# Patient Record
Sex: MALE | Race: WHITE | URBAN - METROPOLITAN AREA
[De-identification: names, ages, dates, MRNs, and addresses within clinical notes are randomized per-mention and may not be internally consistent; named-entity substitution may affect disease eponyms.]

---

## 2017-02-10 DIAGNOSIS — F43.10 PTSD (POST-TRAUMATIC STRESS DISORDER): ICD-10-CM

## 2017-02-10 RX ORDER — DIAZEPAM 5 MG/1
5 TABLET ORAL
Qty: 60 TAB | Refills: 1 | Status: SHIPPED | OUTPATIENT
Start: 2017-02-10 | End: 2017-06-16 | Stop reason: SDUPTHER

## 2017-06-16 ENCOUNTER — OFFICE VISIT (OUTPATIENT)
Dept: FAMILY MEDICINE CLINIC | Age: 47
End: 2017-06-16

## 2017-06-16 VITALS
SYSTOLIC BLOOD PRESSURE: 121 MMHG | RESPIRATION RATE: 14 BRPM | BODY MASS INDEX: 28.25 KG/M2 | TEMPERATURE: 98.5 F | OXYGEN SATURATION: 97 % | HEART RATE: 81 BPM | DIASTOLIC BLOOD PRESSURE: 82 MMHG | WEIGHT: 180 LBS | HEIGHT: 67 IN

## 2017-06-16 DIAGNOSIS — F41.9 ANXIETY: ICD-10-CM

## 2017-06-16 DIAGNOSIS — F43.10 PTSD (POST-TRAUMATIC STRESS DISORDER): Primary | ICD-10-CM

## 2017-06-16 RX ORDER — DIAZEPAM 5 MG/1
5 TABLET ORAL
Qty: 14 TAB | Refills: 0 | Status: SHIPPED | OUTPATIENT
Start: 2017-06-16 | End: 2017-06-26 | Stop reason: SDUPTHER

## 2017-06-16 NOTE — PATIENT INSTRUCTIONS
Diazepam (By mouth)   Diazepam (dye-AZ-e-cora)  Treats anxiety, alcohol withdrawal, muscle spasms, and seizures. Brand Name(s): Valium, diazePAM Intensol   There may be other brand names for this medicine. When This Medicine Should Not Be Used: This medicine is not right for everyone. Do not use it if you had an allergic reaction to diazepam, or if you are pregnant or breastfeeding, or you have narrow-angle glaucoma, myasthenia gravis, sleep apnea, or severe breathing problems. Do not give this medicine to any child 10months of age or younger. How to Use This Medicine:   Liquid, Tablet  · Your doctor will tell you how much medicine to use. Do not use more than directed. · Oral liquid: Measure the oral liquid medicine with a marked measuring spoon, oral syringe, or medicine cup. · This medicine should come with a Medication Guide. Ask your pharmacist for a copy if you do not have one. · Missed dose: Take a dose as soon as you remember. If it is almost time for your next dose, wait until then and take a regular dose. Do not take extra medicine to make up for a missed dose. · Store the medicine in a closed container at room temperature, away from heat, moisture, and direct light. Do not freeze the oral liquid. Drugs and Foods to Avoid:   Ask your doctor or pharmacist before using any other medicine, including over-the-counter medicines, vitamins, and herbal products. · Some medicines can affect how diazepam works. Tell your doctor if you are using any of the following:   ¨ Cimetidine, fluoxetine, fluvoxamine, ketoconazole, omeprazole, phenytoin, or theophylline  ¨ MAO inhibitor  ¨ Medicine to treat depression or mental health problem  ¨ Medicine to treat seizures  ¨ Phenothiazine medicine  · Do not drink alcohol while you are using this medicine. · Tell your doctor if you use anything else that makes you sleepy. Some examples are allergy medicine, narcotic pain medicine, and alcohol.   Warnings While Using This Medicine:   · It is not safe to take this medicine during pregnancy. It could harm an unborn baby. Tell your doctor right away if you become pregnant. · Tell your doctor if you have kidney disease, liver disease, glaucoma, lung or breathing problems, or a history of drug or alcohol abuse, depression, mental health problems, or seizures. · This medicine may cause the following problems:  ¨ Respiratory depression (serious breathing problem that can be life-threatening), when used with narcotic pain medicines  · This medicine can increase thoughts of suicide. Tell your doctor right away if you start to feel depressed and have thoughts about hurting yourself. · This medicine may make you dizzy or drowsy. Do not drive or do anything else that could be dangerous until you know how this medicine affects you. · This medicine can be habit-forming. Do not use more than your prescribed dose. Call your doctor if you think your medicine is not working. · Do not stop using this medicine suddenly. Your doctor will need to slowly decrease your dose before you stop it completely. · Your doctor will do lab tests at regular visits to check on the effects of this medicine. Keep all appointments. · Keep all medicine out of the reach of children. Never share your medicine with anyone.   Possible Side Effects While Using This Medicine:   Call your doctor right away if you notice any of these side effects:  · Allergic reaction: Itching or hives, swelling in your face or hands, swelling or tingling in your mouth or throat, chest tightness, trouble breathing  · Blue lips, fingernails, or skin  · Confusion, lightheadedness, dizziness, problems with muscle control or coordination  · Extreme drowsiness or weakness, slow heartbeat, trouble breathing  · Seizures or tremors  · Unusual mood or behavior, worsening depression, thoughts about hurting yourself, trouble sleeping  If you notice these less serious side effects, talk with your doctor:   · Blurred vision, changes in vision  · Headache  · Nausea, vomiting, constipation, diarrhea, stomach pain  · Tiredness  If you notice other side effects that you think are caused by this medicine, tell your doctor. Call your doctor for medical advice about side effects. You may report side effects to FDA at 0-868-QHE-5495  © 2017 [facility] Information is for End User's use only and may not be sold, redistributed or otherwise used for commercial purposes. The above information is an  only. It is not intended as medical advice for individual conditions or treatments. Talk to your doctor, nurse or pharmacist before following any medical regimen to see if it is safe and effective for you.

## 2017-06-16 NOTE — PROGRESS NOTES
HISTORY OF PRESENT ILLNESS  Elaine Rosales is a 50 y.o. male with pmhx of PtSD and anxiety here today for a refill of his valium. He was being seen by PCP Wade Veloz and she was prescribing his medications but he is in transition with a new PCP due to his provider leaving practice. He has an appointment scheduled with new PCP 9/26/17 TRACY Panchal. He is an employee of [employer] and has a scheduled training trip this week for training and he states he does not have enough medication for his trip and to make it through the week. Patient denies alcohol abuse or illicit drug use. He is currently followed by a psychotherapists as well. After reviewing records the patient has a documented history of being on valium since 2014, and he is a credible historian. Medication Refill   The history is provided by the patient. This is a chronic problem. Review of Systems   Respiratory: Negative. Cardiovascular: Negative. Neurological: Negative. Physical Exam   Constitutional: He appears well-developed and well-nourished. No distress. Cardiovascular: Normal rate and regular rhythm. Pulmonary/Chest: Effort normal and breath sounds normal.   Psychiatric: He has a normal mood and affect. His speech is normal and behavior is normal. Judgment and thought content normal. Cognition and memory are normal.   Vitals reviewed. ASSESSMENT and PLAN  Heather Garcia was seen today for medication refill. Diagnoses and all orders for this visit:    PTSD (post-traumatic stress disorder)  -     diazePAM (VALIUM) 5 mg tablet; Take 1 Tab by mouth every twelve (12) hours as needed for Anxiety. Max Daily Amount: 10 mg. Indications: anxiety, PANIC DISORDER    Anxiety  -     diazePAM (VALIUM) 5 mg tablet; Take 1 Tab by mouth every twelve (12) hours as needed for Anxiety. Max Daily Amount: 10 mg.  Indications: anxiety, PANIC DISORDER     and patient Medical Records reviewed   Reviewed SE and discussed no driving or operating heavy machinery when taking medication   AVS given   Follow up with PCP in 1 week.

## 2017-06-16 NOTE — MR AVS SNAPSHOT
Visit Information Date & Time Provider Department Dept. Phone Encounter #  
 6/16/2017 10:00 AM Gricel Gardner. [facility] [address] [id number] Follow-up Instructions Return in about 1 week (around 6/23/2017) for with PCP . Your Appointments 6/26/2017  8:00 AM  
Follow Up with ALBINO [facility] [place] ([address]) Appt Note: Elise patient-Refill of viagra and valium [place]. [address]  
  
   
 [address] Upcoming Health Maintenance Date Due Pneumococcal 19-64 Medium Risk (1 of 1 - PPSV23) 5/9/1987 INFLUENZA AGE 9 TO ADULT 8/1/2017 DTaP/Tdap/Td series (2 - Td) 4/1/2024 Allergies as of 6/16/2017  Review Complete On: 6/16/2017 By: Murray Contreras NP Severity Noted Reaction Type Reactions Prazosin High 12/04/2014   Side Effect Drowsiness Crestor [Rosuvastatin]  10/27/2014    Myalgia Lipitor [Atorvastatin]  11/17/2014   Not Verified Other (comments)  
 cramps Current Immunizations  Reviewed on 11/8/2016 Name Date Influenza Vaccine 10/27/2014 Influenza Vaccine (Quad) PF 11/8/2016 Influenza Vaccine Split 12/17/2012 Tdap 4/1/2014 Not reviewed this visit You Were Diagnosed With   
  
 Codes Comments PTSD (post-traumatic stress disorder)    -  Primary ICD-10-CM: F43.10 ICD-9-CM: 309.81 Anxiety     ICD-10-CM: F41.9 ICD-9-CM: 300.00 Vitals BP Pulse Temp Resp Height(growth percentile) Weight(growth percentile) 121/82 (BP 1 Location: Left arm, BP Patient Position: Sitting) 81 98.5 °F (36.9 °C) (Oral) 14 5' 7\" (1.702 m) 180 lb (81.6 kg) SpO2 BMI Smoking Status 97% 28.19 kg/m2 Current Every Day Smoker Vitals History BMI and BSA Data Body Mass Index Body Surface Area  
 28.19 kg/m 2 1.96 m 2 Preferred Pharmacy Pharmacy Name Phone [address] [facility], [place] [phone] Your Updated Medication List  
  
   
This list is accurate as of: 6/16/17 10:05 AM.  Always use your most recent med list.  
  
  
  
  
 diazePAM 5 mg tablet Commonly known as:  VALIUM Take 1 Tab by mouth every twelve (12) hours as needed for Anxiety. Max Daily Amount: 10 mg. Indications: anxiety, PANIC DISORDER  
  
 omeprazole 20 mg capsule Commonly known as:  PRILOSEC Take 1 Cap by mouth daily. sildenafil citrate 50 mg tablet Commonly known as:  VIAGRA Take 1 tab by mouth as needed 1 hour prior to sexual activity Prescriptions Printed Refills  
 diazePAM (VALIUM) 5 mg tablet 0 Sig: Take 1 Tab by mouth every twelve (12) hours as needed for Anxiety. Max Daily Amount: 10 mg. Indications: anxiety, PANIC DISORDER Class: Print Route: Oral  
  
Follow-up Instructions Return in about 1 week (around 6/23/2017) for with PCP . Patient Instructions Diazepam (By mouth) Diazepam (dye-AZ-e-cora) Treats anxiety, alcohol withdrawal, muscle spasms, and seizures. Brand Name(s): Valium, diazePAM Intensol There may be other brand names for this medicine. When This Medicine Should Not Be Used: This medicine is not right for everyone. Do not use it if you had an allergic reaction to diazepam, or if you are pregnant or breastfeeding, or you have narrow-angle glaucoma, myasthenia gravis, sleep apnea, or severe breathing problems. Do not give this medicine to any child 10months of age or younger. How to Use This Medicine:  
Liquid, Tablet · Your doctor will tell you how much medicine to use. Do not use more than directed. · Oral liquid: Measure the oral liquid medicine with a marked measuring spoon, oral syringe, or medicine cup. · This medicine should come with a Medication Guide. Ask your pharmacist for a copy if you do not have one. · Missed dose: Take a dose as soon as you remember. If it is almost time for your next dose, wait until then and take a regular dose. Do not take extra medicine to make up for a missed dose. · Store the medicine in a closed container at room temperature, away from heat, moisture, and direct light. Do not freeze the oral liquid. Drugs and Foods to Avoid: Ask your doctor or pharmacist before using any other medicine, including over-the-counter medicines, vitamins, and herbal products. · Some medicines can affect how diazepam works. Tell your doctor if you are using any of the following: ¨ Cimetidine, fluoxetine, fluvoxamine, ketoconazole, omeprazole, phenytoin, or theophylline ¨ MAO inhibitor ¨ Medicine to treat depression or mental health problem ¨ Medicine to treat seizures ¨ Phenothiazine medicine · Do not drink alcohol while you are using this medicine. · Tell your doctor if you use anything else that makes you sleepy. Some examples are allergy medicine, narcotic pain medicine, and alcohol. Warnings While Using This Medicine: · It is not safe to take this medicine during pregnancy. It could harm an unborn baby. Tell your doctor right away if you become pregnant. · Tell your doctor if you have kidney disease, liver disease, glaucoma, lung or breathing problems, or a history of drug or alcohol abuse, depression, mental health problems, or seizures. · This medicine may cause the following problems: 
¨ Respiratory depression (serious breathing problem that can be life-threatening), when used with narcotic pain medicines · This medicine can increase thoughts of suicide. Tell your doctor right away if you start to feel depressed and have thoughts about hurting yourself. · This medicine may make you dizzy or drowsy. Do not drive or do anything else that could be dangerous until you know how this medicine affects you. · This medicine can be habit-forming.  Do not use more than your prescribed dose. Call your doctor if you think your medicine is not working. · Do not stop using this medicine suddenly. Your doctor will need to slowly decrease your dose before you stop it completely. · Your doctor will do lab tests at regular visits to check on the effects of this medicine. Keep all appointments. · Keep all medicine out of the reach of children. Never share your medicine with anyone. Possible Side Effects While Using This Medicine:  
Call your doctor right away if you notice any of these side effects: · Allergic reaction: Itching or hives, swelling in your face or hands, swelling or tingling in your mouth or throat, chest tightness, trouble breathing · Blue lips, fingernails, or skin · Confusion, lightheadedness, dizziness, problems with muscle control or coordination · Extreme drowsiness or weakness, slow heartbeat, trouble breathing · Seizures or tremors · Unusual mood or behavior, worsening depression, thoughts about hurting yourself, trouble sleeping If you notice these less serious side effects, talk with your doctor: · Blurred vision, changes in vision · Headache · Nausea, vomiting, constipation, diarrhea, stomach pain · Tiredness If you notice other side effects that you think are caused by this medicine, tell your doctor. Call your doctor for medical advice about side effects. You may report side effects to FDA at 3-038-FDA-3134 © 2017 [address] Information is for End User's use only and may not be sold, redistributed or otherwise used for commercial purposes. The above information is an  only. It is not intended as medical advice for individual conditions or treatments. Talk to your doctor, nurse or pharmacist before following any medical regimen to see if it is safe and effective for you. Introducing [facility]!    
 Elio Flaherty introduces [employer] patient portal. Now you can access parts of your medical record, email your doctor's office, and request medication refills online. 1. In your internet browser, go to [email or web address] 2. Click on the First Time User? Click Here link in the Sign In box. You will see the New Member Sign Up page. 3. Enter your [employer] Access Code exactly as it appears below. You will not need to use this code after youve completed the sign-up process. If you do not sign up before the expiration date, you must request a new code. · [employer] Access Code: [email or web address] Expires: 9/14/2017 10:03 AM 
 
4. Enter the last four digits of your Social Security Number (xxxx) and Date of Birth (mm/dd/yyyy) as indicated and click Submit. You will be taken to the next sign-up page. 5. Create a [employer]icket[employer] ID. This will be your [employer] login ID and cannot be changed, so think of one that is secure and easy to remember. 6. Create a [employer] password. You can change your password at any time. 7. Enter your Password Reset Question and Answer. This can be used at a later time if you forget your password. 8. Enter your e-mail address. You will receive e-mail notification when new information is available in [address] 9. Click Sign Up. You can now view and download portions of your medical record. 10. Click the Download Summary menu link to download a portable copy of your medical information. If you have questions, please visit the Frequently Asked Questions section of the [employer] website. Remember, [employer] is NOT to be used for urgent needs. For medical emergencies, dial 911. Now available from your iPhone and Android! Please provide this summary of care documentation to your next provider. Your primary care clinician is listed as Darion Hannah. If you have any questions after today's visit, please call [phone].

## 2017-06-26 ENCOUNTER — OFFICE VISIT (OUTPATIENT)
Dept: FAMILY MEDICINE CLINIC | Age: 47
End: 2017-06-26

## 2017-06-26 VITALS
HEART RATE: 77 BPM | TEMPERATURE: 98 F | BODY MASS INDEX: 28.72 KG/M2 | HEIGHT: 67 IN | RESPIRATION RATE: 18 BRPM | SYSTOLIC BLOOD PRESSURE: 121 MMHG | WEIGHT: 183 LBS | DIASTOLIC BLOOD PRESSURE: 76 MMHG

## 2017-06-26 DIAGNOSIS — F52.21 ERECTILE DYSFUNCTION OF NON-ORGANIC ORIGIN: ICD-10-CM

## 2017-06-26 DIAGNOSIS — E55.9 VITAMIN D DEFICIENCY: ICD-10-CM

## 2017-06-26 DIAGNOSIS — M21.42 PES PLANUS OF BOTH FEET: ICD-10-CM

## 2017-06-26 DIAGNOSIS — M72.2 PLANTAR FASCIITIS: ICD-10-CM

## 2017-06-26 DIAGNOSIS — F41.9 ANXIETY: ICD-10-CM

## 2017-06-26 DIAGNOSIS — R73.03 PREDIABETES: ICD-10-CM

## 2017-06-26 DIAGNOSIS — M21.41 PES PLANUS OF BOTH FEET: ICD-10-CM

## 2017-06-26 DIAGNOSIS — E78.2 MIXED HYPERLIPIDEMIA: ICD-10-CM

## 2017-06-26 DIAGNOSIS — F43.10 PTSD (POST-TRAUMATIC STRESS DISORDER): Primary | ICD-10-CM

## 2017-06-26 RX ORDER — DIAZEPAM 5 MG/1
5 TABLET ORAL
Qty: 14 TAB | Refills: 0 | Status: SHIPPED | OUTPATIENT
Start: 2017-06-26 | End: 2018-03-02

## 2017-06-26 RX ORDER — SILDENAFIL 50 MG/1
TABLET, FILM COATED ORAL
Qty: 8 TAB | Refills: 2 | Status: SHIPPED | OUTPATIENT
Start: 2017-06-26

## 2017-06-26 NOTE — PROGRESS NOTES
1. Have you been to the ER, urgent care clinic since your last visit? Hospitalized since your last visit? Yes Where: [facility] [place] 6-16-17 enough medication to bridge to appointment today    2. Have you seen or consulted any other health care providers outside of the [address] [place] [address] since your last visit? Include any pap smears or colon screening.  No

## 2017-06-26 NOTE — PATIENT INSTRUCTIONS
Hyperlipidemia: After Your Visit  Your Care Instructions  Hyperlipidemia is too much fat in your blood. The body has several kinds of fat, including cholesterol and triglycerides. Your body needs fat for many things, such as making new cells. But too much fat in your blood increases your chances of having a heart attack or stroke. You may be able to lower your cholesterol and triglycerides with a heart-healthy diet, exercise, and if needed, medicine. Your doctor may want you to try lifestyle changes first to see whether they lower the fat in your blood. You may need to take medicine if lifestyle changes do not lower the fat in your blood enough. Follow-up care is a key part of your treatment and safety. Be sure to make and go to all appointments, and call your doctor if you are having problems. Its also a good idea to know your test results and keep a list of the medicines you take. How can you care for yourself at home? Take your medicines  · Take your medicines exactly as prescribed. Call your doctor if you think you are having a problem with your medicine. · If you take medicine to lower your cholesterol, go to follow-up visits. You will need to have blood tests. · Do not take large doses of niacin, which is a B vitamin, while taking medicine called statins. It may increase the chance of muscle pain and liver problems. · Talk to your doctor about avoiding grapefruit juice if you are taking statins. Grapefruit juice can raise the level of this medicine in your blood. This could increase side effects. Eat more fruits, vegetables, and fiber  · Fruits and vegetables have lots of nutrients that help protect against heart disease, and they have little--if any--fat. Try to eat at least five servings a day. Dark green, deep orange, or yellow fruits and vegetables are healthy choices. · Keep carrots, celery, and other veggies handy for snacks.  Buy fruit that is in season and store it where you can see it so that you will be tempted to eat it. Cook dishes that have a lot of veggies in them, such as stir-fries and soups. · Foods high in fiber may reduce your cholesterol and provide important vitamins and minerals. High-fiber foods include whole-grain cereals and breads, oatmeal, beans, brown rice, citrus fruits, and apples. · Buy whole-grain breads and cereals instead of white bread and pastries. Limit saturated fat  · Read food labels and try to avoid saturated fat and trans fat. They increase your risk of heart disease. · Use olive or canola oil when you cook. Try cholesterol-lowering spreads, such as Benecol or Take Control. · Bake, broil, grill, or steam foods instead of frying them. · Limit the amount of high-fat meats you eat, including hot dogs and sausages. Cut out all visible fat when you prepare meat. · Eat fish, skinless poultry, and soy products such as tofu instead of high-fat meats. Soybeans may be especially good for your heart. Eat at least two servings of fish a week. Certain fish, such as salmon, contain omega-3 fatty acids, which may help reduce your risk of heart attack. · Choose low-fat or fat-free milk and dairy products. Get exercise, limit alcohol, and quit smoking  · Get more exercise. Work with your doctor to set up an exercise program. Even if you can do only a small amount, exercise will help you get stronger, have more energy, and manage your weight and your stress. Walking is an easy way to get exercise. Gradually increase the amount you walk every day. Aim for at least 30 minutes on most days of the week. You also may want to swim, bike, or do other activities. · Limit alcohol to no more than 2 drinks a day for men and 1 drink a day for women. · Do not smoke. If you need help quitting, talk to your doctor about stop-smoking programs and medicines. These can increase your chances of quitting for good. When should you call for help?   Call 911 anytime you think you may need emergency care. For example, call if:  · You have symptoms of a heart attack. These may include:  ¨ Chest pain or pressure, or a strange feeling in the chest.  ¨ Sweating. ¨ Shortness of breath. ¨ Nausea or vomiting. ¨ Pain, pressure, or a strange feeling in the back, neck, jaw, or upper belly or in one or both shoulders or arms. ¨ Lightheadedness or sudden weakness. ¨ A fast or irregular heartbeat. After you call 911, the  may tell you to chew 1 adult-strength or 2 to 4 low-dose aspirin. Wait for an ambulance. Do not try to drive yourself. · You have signs of a stroke. These may include:  ¨ Sudden numbness, paralysis, or weakness in your face, arm, or leg, especially on only one side of your body. ¨ New problems with walking or balance. ¨ Sudden vision changes. ¨ Drooling or slurred speech. ¨ New problems speaking or understanding simple statements, or feeling confused. ¨ A sudden, severe headache that is different from past headaches. · You passed out (lost consciousness). Call your doctor now or seek immediate medical care if:  · You have muscle pain or weakness. Watch closely for changes in your health, and be sure to contact your doctor if:  · You are very tired. · You have an upset stomach, gas, constipation, or belly pain or cramps. Where can you learn more? Go to [employer]  Enter C406 in the search box to learn more about \"Hyperlipidemia: After Your Visit. \"   © 4232-6404 [employer], [employer]. Care instructions adapted under license by [employer] (which disclaims liability or warranty for this information). This care instruction is for use with your licensed healthcare professional. If you have questions about a medical condition or this instruction, always ask your healthcare professional. [place] [address] any warranty or liability for your use of this information.   Content Version: 7.3.237221; Last Revised: October 13, 2011              Hyperlipidemia: After Your Visit  Your Care Instructions  Hyperlipidemia is too much fat in your blood. The body has several kinds of fat, including cholesterol and triglycerides. Your body needs fat for many things, such as making new cells. But too much fat in your blood increases your chances of having a heart attack or stroke. You may be able to lower your cholesterol and triglycerides with a heart-healthy diet, exercise, and if needed, medicine. Your doctor may want you to try lifestyle changes first to see whether they lower the fat in your blood. You may need to take medicine if lifestyle changes do not lower the fat in your blood enough. Follow-up care is a key part of your treatment and safety. Be sure to make and go to all appointments, and call your doctor if you are having problems. Its also a good idea to know your test results and keep a list of the medicines you take. How can you care for yourself at home? Take your medicines  · Take your medicines exactly as prescribed. Call your doctor if you think you are having a problem with your medicine. · If you take medicine to lower your cholesterol, go to follow-up visits. You will need to have blood tests. · Do not take large doses of niacin, which is a B vitamin, while taking medicine called statins. It may increase the chance of muscle pain and liver problems. · Talk to your doctor about avoiding grapefruit juice if you are taking statins. Grapefruit juice can raise the level of this medicine in your blood. This could increase side effects. Eat more fruits, vegetables, and fiber  · Fruits and vegetables have lots of nutrients that help protect against heart disease, and they have little--if any--fat. Try to eat at least five servings a day. Dark green, deep orange, or yellow fruits and vegetables are healthy choices. · Keep carrots, celery, and other veggies handy for snacks.  Buy fruit that is in season and store it where you can see it so that you will be tempted to eat it. Cook dishes that have a lot of veggies in them, such as stir-fries and soups. · Foods high in fiber may reduce your cholesterol and provide important vitamins and minerals. High-fiber foods include whole-grain cereals and breads, oatmeal, beans, brown rice, citrus fruits, and apples. · Buy whole-grain breads and cereals instead of white bread and pastries. Limit saturated fat  · Read food labels and try to avoid saturated fat and trans fat. They increase your risk of heart disease. · Use olive or canola oil when you cook. Try cholesterol-lowering spreads, such as Benecol or Take Control. · Bake, broil, grill, or steam foods instead of frying them. · Limit the amount of high-fat meats you eat, including hot dogs and sausages. Cut out all visible fat when you prepare meat. · Eat fish, skinless poultry, and soy products such as tofu instead of high-fat meats. Soybeans may be especially good for your heart. Eat at least two servings of fish a week. Certain fish, such as salmon, contain omega-3 fatty acids, which may help reduce your risk of heart attack. · Choose low-fat or fat-free milk and dairy products. Get exercise, limit alcohol, and quit smoking  · Get more exercise. Work with your doctor to set up an exercise program. Even if you can do only a small amount, exercise will help you get stronger, have more energy, and manage your weight and your stress. Walking is an easy way to get exercise. Gradually increase the amount you walk every day. Aim for at least 30 minutes on most days of the week. You also may want to swim, bike, or do other activities. · Limit alcohol to no more than 2 drinks a day for men and 1 drink a day for women. · Do not smoke. If you need help quitting, talk to your doctor about stop-smoking programs and medicines. These can increase your chances of quitting for good. When should you call for help?   Call 911 anytime you think you may need emergency care. For example, call if:  · You have symptoms of a heart attack. These may include:  ¨ Chest pain or pressure, or a strange feeling in the chest.  ¨ Sweating. ¨ Shortness of breath. ¨ Nausea or vomiting. ¨ Pain, pressure, or a strange feeling in the back, neck, jaw, or upper belly or in one or both shoulders or arms. ¨ Lightheadedness or sudden weakness. ¨ A fast or irregular heartbeat. After you call 911, the  may tell you to chew 1 adult-strength or 2 to 4 low-dose aspirin. Wait for an ambulance. Do not try to drive yourself. · You have signs of a stroke. These may include:  ¨ Sudden numbness, paralysis, or weakness in your face, arm, or leg, especially on only one side of your body. ¨ New problems with walking or balance. ¨ Sudden vision changes. ¨ Drooling or slurred speech. ¨ New problems speaking or understanding simple statements, or feeling confused. ¨ A sudden, severe headache that is different from past headaches. · You passed out (lost consciousness). Call your doctor now or seek immediate medical care if:  · You have muscle pain or weakness. Watch closely for changes in your health, and be sure to contact your doctor if:  · You are very tired. · You have an upset stomach, gas, constipation, or belly pain or cramps. Where can you learn more? Go to [employer]  Enter C406 in the search box to learn more about \"Hyperlipidemia: After Your Visit. \"   © 4296-5446 Healthwise, Incorporated. Care instructions adapted under license by [employer] (which disclaims liability or warranty for this information). This care instruction is for use with your licensed healthcare professional. If you have questions about a medical condition or this instruction, always ask your healthcare professional. [place] [address] any warranty or liability for your use of this information.   Content Version: 6.5.636826; Last Revised: October 13, 2011                 DASH Diet: Care Instructions  Your Care Instructions  The DASH diet is an eating plan that can help lower your blood pressure. DASH stands for Dietary Approaches to Stop Hypertension. Hypertension is high blood pressure. The DASH diet focuses on eating foods that are high in calcium, potassium, and magnesium. These nutrients can lower blood pressure. The foods that are highest in these nutrients are fruits, vegetables, low-fat dairy products, nuts, seeds, and legumes. But taking calcium, potassium, and magnesium supplements instead of eating foods that are high in those nutrients does not have the same effect. The DASH diet also includes whole grains, fish, and poultry. The DASH diet is one of several lifestyle changes your doctor may recommend to lower your high blood pressure. Your doctor may also want you to decrease the amount of sodium in your diet. Lowering sodium while following the DASH diet can lower blood pressure even further than just the DASH diet alone. Follow-up care is a key part of your treatment and safety. Be sure to make and go to all appointments, and call your doctor if you are having problems. It's also a good idea to know your test results and keep a list of the medicines you take. How can you care for yourself at home? Following the DASH diet  · Eat 4 to 5 servings of fruit each day. A serving is 1 medium-sized piece of fruit, ½ cup chopped or canned fruit, 1/4 cup dried fruit, or 4 ounces (½ cup) of fruit juice. Choose fruit more often than fruit juice. · Eat 4 to 5 servings of vegetables each day. A serving is 1 cup of lettuce or raw leafy vegetables, ½ cup of chopped or cooked vegetables, or 4 ounces (½ cup) of vegetable juice. Choose vegetables more often than vegetable juice. · Get 2 to 3 servings of low-fat and fat-free dairy each day. A serving is 8 ounces of milk, 1 cup of yogurt, or 1 ½ ounces of cheese. · Eat 6 to 8 servings of grains each day.  A serving is 1 slice of bread, 1 ounce of dry cereal, or ½ cup of cooked rice, pasta, or cooked cereal. Try to choose whole-grain products as much as possible. · Limit lean meat, poultry, and fish to 2 servings each day. A serving is 3 ounces, about the size of a deck of cards. · Eat 4 to 5 servings of nuts, seeds, and legumes (cooked dried beans, lentils, and split peas) each week. A serving is 1/3 cup of nuts, 2 tablespoons of seeds, or ½ cup of cooked beans or peas. · Limit fats and oils to 2 to 3 servings each day. A serving is 1 teaspoon of vegetable oil or 2 tablespoons of salad dressing. · Limit sweets and added sugars to 5 servings or less a week. A serving is 1 tablespoon jelly or jam, ½ cup sorbet, or 1 cup of lemonade. · Eat less than 2,300 milligrams (mg) of sodium a day. If you limit your sodium to 1,500 mg a day, you can lower your blood pressure even more. Tips for success  · Start small. Do not try to make dramatic changes to your diet all at once. You might feel that you are missing out on your favorite foods and then be more likely to not follow the plan. Make small changes, and stick with them. Once those changes become habit, add a few more changes. · Try some of the following:  ¨ Make it a goal to eat a fruit or vegetable at every meal and at snacks. This will make it easy to get the recommended amount of fruits and vegetables each day. ¨ Try yogurt topped with fruit and nuts for a snack or healthy dessert. ¨ Add lettuce, tomato, cucumber, and onion to sandwiches. ¨ Combine a ready-made pizza crust with low-fat mozzarella cheese and lots of vegetable toppings. Try using tomatoes, squash, spinach, broccoli, carrots, cauliflower, and onions. ¨ Have a variety of cut-up vegetables with a low-fat dip as an appetizer instead of chips and dip. ¨ Sprinkle sunflower seeds or chopped almonds over salads. Or try adding chopped walnuts or almonds to cooked vegetables.   ¨ Try some vegetarian meals using beans and peas. Add garbanzo or kidney beans to salads. Make burritos and tacos with mashed matos beans or black beans. Where can you learn more? Go to http://alejandro-ge.info/. Enter J327 in the search box to learn more about \"DASH Diet: Care Instructions. \"  Current as of: April 3, 2017  Content Version: 11.3  © 2501-9363 [employer]. Care instructions adapted under license by [employer] (which disclaims liability or warranty for this information). If you have questions about a medical condition or this instruction, always ask your healthcare professional. [place] [address] any warranty or liability for your use of this information. DASH Diet: Care Instructions  Your Care Instructions  The DASH diet is an eating plan that can help lower your blood pressure. DASH stands for Dietary Approaches to Stop Hypertension. Hypertension is high blood pressure. The DASH diet focuses on eating foods that are high in calcium, potassium, and magnesium. These nutrients can lower blood pressure. The foods that are highest in these nutrients are fruits, vegetables, low-fat dairy products, nuts, seeds, and legumes. But taking calcium, potassium, and magnesium supplements instead of eating foods that are high in those nutrients does not have the same effect. The DASH diet also includes whole grains, fish, and poultry. The DASH diet is one of several lifestyle changes your doctor may recommend to lower your high blood pressure. Your doctor may also want you to decrease the amount of sodium in your diet. Lowering sodium while following the DASH diet can lower blood pressure even further than just the DASH diet alone. Follow-up care is a key part of your treatment and safety. Be sure to make and go to all appointments, and call your doctor if you are having problems.  It's also a good idea to know your test results and keep a list of the medicines you take.  How can you care for yourself at home? Following the DASH diet  · Eat 4 to 5 servings of fruit each day. A serving is 1 medium-sized piece of fruit, ½ cup chopped or canned fruit, 1/4 cup dried fruit, or 4 ounces (½ cup) of fruit juice. Choose fruit more often than fruit juice. · Eat 4 to 5 servings of vegetables each day. A serving is 1 cup of lettuce or raw leafy vegetables, ½ cup of chopped or cooked vegetables, or 4 ounces (½ cup) of vegetable juice. Choose vegetables more often than vegetable juice. · Get 2 to 3 servings of low-fat and fat-free dairy each day. A serving is 8 ounces of milk, 1 cup of yogurt, or 1 ½ ounces of cheese. · Eat 6 to 8 servings of grains each day. A serving is 1 slice of bread, 1 ounce of dry cereal, or ½ cup of cooked rice, pasta, or cooked cereal. Try to choose whole-grain products as much as possible. · Limit lean meat, poultry, and fish to 2 servings each day. A serving is 3 ounces, about the size of a deck of cards. · Eat 4 to 5 servings of nuts, seeds, and legumes (cooked dried beans, lentils, and split peas) each week. A serving is 1/3 cup of nuts, 2 tablespoons of seeds, or ½ cup of cooked beans or peas. · Limit fats and oils to 2 to 3 servings each day. A serving is 1 teaspoon of vegetable oil or 2 tablespoons of salad dressing. · Limit sweets and added sugars to 5 servings or less a week. A serving is 1 tablespoon jelly or jam, ½ cup sorbet, or 1 cup of lemonade. · Eat less than 2,300 milligrams (mg) of sodium a day. If you limit your sodium to 1,500 mg a day, you can lower your blood pressure even more. Tips for success  · Start small. Do not try to make dramatic changes to your diet all at once. You might feel that you are missing out on your favorite foods and then be more likely to not follow the plan. Make small changes, and stick with them. Once those changes become habit, add a few more changes.   · Try some of the following:  ¨ Make it a goal to eat a fruit or vegetable at every meal and at snacks. This will make it easy to get the recommended amount of fruits and vegetables each day. ¨ Try yogurt topped with fruit and nuts for a snack or healthy dessert. ¨ Add lettuce, tomato, cucumber, and onion to sandwiches. ¨ Combine a ready-made pizza crust with low-fat mozzarella cheese and lots of vegetable toppings. Try using tomatoes, squash, spinach, broccoli, carrots, cauliflower, and onions. ¨ Have a variety of cut-up vegetables with a low-fat dip as an appetizer instead of chips and dip. ¨ Sprinkle sunflower seeds or chopped almonds over salads. Or try adding chopped walnuts or almonds to cooked vegetables. ¨ Try some vegetarian meals using beans and peas. Add garbanzo or kidney beans to salads. Make burritos and tacos with mashed matos beans or black beans. Where can you learn more? Go to http://alejandro-ge.info/. Enter G399 in the search box to learn more about \"DASH Diet: Care Instructions. \"  Current as of: April 3, 2017  Content Version: 11.3  © 2868-7648 [employer]. Care instructions adapted under license by [email or web address] (which disclaims liability or warranty for this information). If you have questions about a medical condition or this instruction, always ask your healthcare professional. [facility] any warranty or liability for your use of this information.

## 2017-06-26 NOTE — PROGRESS NOTES
Chief Complaint   Patient presents with    Establish Care    Medication Refill     HPI:    This is a 49 y/o formal male patient of  ALBINO Ochoa who presents for the above reason    Conform history of PTSD, depression and anxiety and need Valium refill. Per patient he  gabapentin for a long time and was started on Valium for this which have been helping. Sates he sees psych but Diagnosed with PTSD and was taken off gabentin and started on Valium Gabriela    See psychiatrist but states he saw different ones when asked request continue prescription for valium from his psychiatrist.  Today he request for a new referral and list of psychiatrist in the area given to him and he was advised to call and schedule appointment with one. Patient also need Viagra refill. Have been on this for a long time. He states is able to initiate erection but have problem maintaining. Last labs show elevated :  A1C at 5.7  Vit D low at 28.3    Lipids also elevated - patient states he was on Zetia but stopped taking because he has been managing with diet and exercise. Component Results   Component Value Flag Ref Range Units Status   LIPID PROFILE         Final   Cholesterol, total 219 (H) <200 MG/DL Final   Triglyceride 253 (H) <150 MG/DL Final   HDL Cholesterol 38 (L) 40 - 60 MG/DL Final   LDL, calculated 130.4 (H) 0 - 100 MG/DL Final   VLDL, calculated 50.6   MG/DL Final   CHOL/HDL Ratio 5.8 (H) 0 - 5.0   Final       Patient need to shoe inserts for plantar fascitis and flat feet- he was advised to follow up with his podiatrist    ROS:   Pt denies: Wt loss, Fever/Chills, HA, Visual changes, Fatigue, Chest pain, SOB, FELIPE, Abd pain, N/V/D/C, Blood in stool or urine, Edema. Pertinent positive as above in HPI.  All others were negative      Past Medical History:   Diagnosis Date    Anemia NEC     Arthritis     Calculus of kidney     Chronic pain     COPD     Hernia 11/02/2014    2    Hypercholesterolemia     Psychotic disorder  PTSD (post-traumatic stress disorder)        Past Surgical History:   Procedure Laterality Date    HX ORTHOPAEDIC  2007, 1986    ankle surgery ( borostrom surgery), l knee surgery       Family History   Problem Relation Age of Onset    Diabetes Mother     Heart Disease Mother     Hypertension Mother     Diabetes Father     Heart Disease Father     Stroke Father     Hypertension Father     Bleeding Prob Father        Social History     Social History    Marital status: UNKNOWN     Spouse name: N/A    Number of children: N/A    Years of education: N/A     Occupational History    Not on file. Social History Main Topics    Smoking status: Current Every Day Smoker     Packs/day: 0.50     Years: 29.00     Types: Cigarettes    Smokeless tobacco: Never Used    Alcohol use Not on file    Drug use: No    Sexual activity: Not on file     Other Topics Concern    Not on file     Social History Narrative     Current Outpatient Prescriptions   Medication Sig Dispense Refill    diazePAM (VALIUM) 5 mg tablet Take 1 Tab by mouth every twelve (12) hours as needed for Anxiety. Max Daily Amount: 10 mg. Indications: anxiety, PANIC DISORDER 14 Tab 0    omeprazole (PRILOSEC) 20 mg capsule Take 1 Cap by mouth daily.  30 Cap 2    sildenafil citrate (VIAGRA) 50 mg tablet Take 1 tab by mouth as needed 1 hour prior to sexual activity 8 Tab 2     Allergies   Allergen Reactions    Prazosin Drowsiness    Crestor [Rosuvastatin] Myalgia    Lipitor [Atorvastatin] Other (comments)     cramps       Physical Exam:    Vital Signs:   Visit Vitals    /76    Pulse 77    Temp 98 °F (36.7 °C) (Oral)    Resp 18    Ht 5' 7\" (1.702 m)    Wt 183 lb (83 kg)    BMI 28.66 kg/m2         General: a, a & o x 3, afebrile, interacting appropriately, in no acute distress  HEENT: head normocephalic and atraumatic, conjuctiva clear  Skin: warm and dry, no rashes , no bruises, no skin lesions  Neck: supple, symmetrical, no palpable mass, no thyromegaly, no carotid bruits, no JVD  Respiratory: lung sounds clear bilaterally, good air entry, good respiratory effort, no wheezes or crackles  Cardiovascular: normal S1S2, regular rate and rhythm, no murmurs  Abdomen: non-distended, normoactive bowel sounds x 4 quadrants, soft, non-tender to palpation  Musculoskeletal: normal ROM on all joints, no swelling or deformity  Psychiatric: a, a & o x 3, appropriate mood and affect, no thought disorder    Assessment/Plan:      ICD-10-CM ICD-9-CM    1. PTSD (post-traumatic stress disorder) F43.10 309.81 diazePAM (VALIUM) 5 mg tablet      CBC WITH AUTOMATED DIFF      REFERRAL TO PSYCHIATRY   2. Anxiety F41.9 300.00 diazePAM (VALIUM) 5 mg tablet   3. Erectile dysfunction of non-organic origin F52.21 302.72 sildenafil citrate (VIAGRA) 50 mg tablet      TESTOSTERONE, FREE & TOTAL      HEMOGLOBIN A1C WITH EAG    Last A1C -5.7   4. Prediabetes R75.76 366.19 METABOLIC PANEL, COMPREHENSIVE   5. Mixed hyperlipidemia E78.2 272.2 TSH 3RD GENERATION      LIPID PANEL      T4, FREE   6. Vitamin D deficiency E55.9 268.9 VITAMIN D, 25 HYDROXY   7. Plantar fasciitis M72.2 728.71 Follow up with podiatry   8. Pes planus of both feet M21.41 734     M21.42           Additional Notes: Discussed today's diagnosis, treatment plans. Discussed medication indications and side effects. After Visit Summary: Provided and discussed printed patient instructions. Answered questions in relation to today's diagnosis.   Follow-up Disposition: 1 [facility] chronic condition and  Lab review  And return for fasting lab tomorrow        Nahid Husain NP-BC  Family Medicine  [facility]        Orders Placed This Encounter    CBC WITH AUTOMATED DIFF    TSH 3RD GENERATION    LIPID PANEL    VITAMIN D, 25 HYDROXY    TESTOSTERONE, FREE & TOTAL    METABOLIC PANEL, COMPREHENSIVE    T4, FREE    HEMOGLOBIN A1C WITH EAG    REFERRAL TO PSYCHIATRY    diazePAM (VALIUM) 5 mg tablet    sildenafil citrate (VIAGRA) 50 mg tablet

## 2017-06-26 NOTE — MR AVS SNAPSHOT
Visit Information Date & Time Provider Department Dept. Phone Encounter #  
 6/26/2017  8:00 AM Cesar Lee NP [facility] [id number] Follow-up Instructions Return in about 1 month (around 7/26/2017) for Return tomorrow for fasting lab and follow up hyperliupidemai and lab review. Upcoming Health Maintenance Date Due Pneumococcal 19-64 Medium Risk (1 of 1 - PPSV23) 5/9/1987 INFLUENZA AGE 9 TO ADULT 8/1/2017 DTaP/Tdap/Td series (2 - Td) 4/1/2024 Allergies as of 6/26/2017  Review Complete On: 6/26/2017 By: Юлия Wolfe LPN Severity Noted Reaction Type Reactions Prazosin High 12/04/2014   Side Effect Drowsiness Crestor [Rosuvastatin]  10/27/2014    Myalgia Lipitor [Atorvastatin]  11/17/2014   Not Verified Other (comments)  
 cramps Current Immunizations  Reviewed on 11/8/2016 Name Date Influenza Vaccine 10/27/2014 Influenza Vaccine (Quad) PF 11/8/2016 Influenza Vaccine Split 12/17/2012 Tdap 4/1/2014 Not reviewed this visit You Were Diagnosed With   
  
 Codes Comments Prediabetes    -  Primary ICD-10-CM: R73.03 
ICD-9-CM: 790.29 PTSD (post-traumatic stress disorder)     ICD-10-CM: F43.10 ICD-9-CM: 309.81 Anxiety     ICD-10-CM: F41.9 ICD-9-CM: 300.00 Erectile dysfunction of non-organic origin     ICD-10-CM: F52.21 ICD-9-CM: 302.72 Mixed hyperlipidemia     ICD-10-CM: E78.2 ICD-9-CM: 272.2 Vitamin D deficiency     ICD-10-CM: E55.9 ICD-9-CM: 268.9 Plantar fasciitis     ICD-10-CM: M72.2 ICD-9-CM: 728.71 Pes planus of both feet     ICD-10-CM: M21.41, M21.42 
ICD-9-CM: 548 Vitals BP Pulse Temp Resp Height(growth percentile) Weight(growth percentile) 121/76 77 98 °F (36.7 °C) (Oral) 18 5' 7\" (1.702 m) 183 lb (83 kg) BMI Smoking Status 28.66 kg/m2 Current Every Day Smoker Vitals History BMI and BSA Data Body Mass Index Body Surface Area  
 28.66 kg/m 2 1.98 m 2 Preferred Pharmacy [facility] Name [facility] [address] [facility], [place] [phone] Your Updated Medication List  
  
   
This list is accurate as of: 6/26/17  9:02 AM.  Always use your most recent med list.  
  
  
  
  
 diazePAM 5 mg tablet Commonly known as:  VALIUM Take 1 Tab by mouth every twelve (12) hours as needed for Anxiety. Max Daily Amount: 10 mg. Indications: anxiety, PANIC DISORDER  
  
 omeprazole 20 mg capsule Commonly known as:  PRILOSEC Take 1 Cap by mouth daily. sildenafil citrate 50 mg tablet Commonly known as:  VIAGRA Take 1 tab by mouth as needed 1 hour prior to sexual activity Prescriptions Printed Refills  
 diazePAM (VALIUM) 5 mg tablet 0 Sig: Take 1 Tab by mouth every twelve (12) hours as needed for Anxiety. Max Daily Amount: 10 mg. Indications: anxiety, PANIC DISORDER Class: Print Route: Oral  
  
Prescriptions Sent to Pharmacy Refills  
 sildenafil citrate (VIAGRA) 50 mg tablet 2 Sig: Take 1 tab by mouth as needed 1 hour prior to sexual activity Class: Normal  
 [facility] [id number] [facility] [address] [facility], [place] Ph #: [phone] We Performed the Following REFERRAL TO PSYCHIATRY [REF91 Custom] Comments:  
 Please evaluate patient for PTSD, anxiety and depression Follow-up Instructions Return in about 1 month (around 7/26/2017) for Return tomorrow for fasting lab and follow up hyperliupidemai and lab review. To-Do List   
 06/26/2017 Lab:  CBC WITH AUTOMATED DIFF   
  
 06/26/2017 Lab:  HEMOGLOBIN A1C WITH EAG   
  
 06/26/2017 Lab:  LIPID PANEL   
  
 06/26/2017 Lab:  METABOLIC PANEL, COMPREHENSIVE   
  
 06/26/2017 Lab:  T4, FREE   
  
 06/26/2017 Lab:  TESTOSTERONE, FREE & TOTAL   
  
 06/26/2017 Lab:  TSH 3RD GENERATION   
  
 06/26/2017 Lab:  VITAMIN D, 25 HYDROXY Referral Information Referral ID Referred By Referred To  
  
 [id number] Mi Alfaro Not Available Visits Status Start Date End Date 1 New Request 6/26/17 6/26/18 If your referral has a status of pending review or denied, additional information will be sent to support the outcome of this decision. Patient Instructions Hyperlipidemia: After Your Visit Your Care Instructions Hyperlipidemia is too much fat in your blood. The body has several kinds of fat, including cholesterol and triglycerides. Your body needs fat for many things, such as making new cells. But too much fat in your blood increases your chances of having a heart attack or stroke. You may be able to lower your cholesterol and triglycerides with a heart-healthy diet, exercise, and if needed, medicine. Your doctor may want you to try lifestyle changes first to see whether they lower the fat in your blood. You may need to take medicine if lifestyle changes do not lower the fat in your blood enough. Follow-up care is a key part of your treatment and safety. Be sure to make and go to all appointments, and call your doctor if you are having problems. Its also a good idea to know your test results and keep a list of the medicines you take. How can you care for yourself at home? Take your medicines · Take your medicines exactly as prescribed. Call your doctor if you think you are having a problem with your medicine. · If you take medicine to lower your cholesterol, go to follow-up visits. You will need to have blood tests. · Do not take large doses of niacin, which is a B vitamin, while taking medicine called statins. It may increase the chance of muscle pain and liver problems. · Talk to your doctor about avoiding grapefruit juice if you are taking statins.  Grapefruit juice can raise the level of this medicine in your blood. This could increase side effects. Eat more fruits, vegetables, and fiber · Fruits and vegetables have lots of nutrients that help protect against heart disease, and they have littleif anyfat. Try to eat at least five servings a day. Dark green, deep orange, or yellow fruits and vegetables are healthy choices. · Keep carrots, celery, and other veggies handy for snacks. Buy fruit that is in season and store it where you can see it so that you will be tempted to eat it. Cook dishes that have a lot of veggies in them, such as stir-fries and soups. · Foods high in fiber may reduce your cholesterol and provide important vitamins and minerals. High-fiber foods include whole-grain cereals and breads, oatmeal, beans, brown rice, citrus fruits, and apples. · Buy whole-grain breads and cereals instead of white bread and pastries. Limit saturated fat · Read food labels and try to avoid saturated fat and trans fat. They increase your risk of heart disease. · Use olive or canola oil when you cook. Try cholesterol-lowering spreads, such as Benecol or Take Control. · Bake, broil, grill, or steam foods instead of frying them. · Limit the amount of high-fat meats you eat, including hot dogs and sausages. Cut out all visible fat when you prepare meat. · Eat fish, skinless poultry, and soy products such as tofu instead of high-fat meats. Soybeans may be especially good for your heart. Eat at least two servings of fish a week. Certain fish, such as salmon, contain omega-3 fatty acids, which may help reduce your risk of heart attack. · Choose low-fat or fat-free milk and dairy products. Get exercise, limit alcohol, and quit smoking · Get more exercise. Work with your doctor to set up an exercise program. Even if you can do only a small amount, exercise will help you get stronger, have more energy, and manage your weight and your stress. Walking is an easy way to get exercise.  Gradually increase the amount you walk every day. Aim for at least 30 minutes on most days of the week. You also may want to swim, bike, or do other activities. · Limit alcohol to no more than 2 drinks a day for men and 1 drink a day for women. · Do not smoke. If you need help quitting, talk to your doctor about stop-smoking programs and medicines. These can increase your chances of quitting for good. When should you call for help? Call 911 anytime you think you may need emergency care. For example, call if: 
· You have symptoms of a heart attack. These may include: ¨ Chest pain or pressure, or a strange feeling in the chest. 
¨ Sweating. ¨ Shortness of breath. ¨ Nausea or vomiting. ¨ Pain, pressure, or a strange feeling in the back, neck, jaw, or upper belly or in one or both shoulders or arms. ¨ Lightheadedness or sudden weakness. ¨ A fast or irregular heartbeat. After you call 911, the  may tell you to chew 1 adult-strength or 2 to 4 low-dose aspirin. Wait for an ambulance. Do not try to drive yourself. · You have signs of a stroke. These may include: 
¨ Sudden numbness, paralysis, or weakness in your face, arm, or leg, especially on only one side of your body. ¨ New problems with walking or balance. ¨ Sudden vision changes. ¨ Drooling or slurred speech. ¨ New problems speaking or understanding simple statements, or feeling confused. ¨ A sudden, severe headache that is different from past headaches. · You passed out (lost consciousness). Call your doctor now or seek immediate medical care if: 
· You have muscle pain or weakness. Watch closely for changes in your health, and be sure to contact your doctor if: 
· You are very tired. · You have an upset stomach, gas, constipation, or belly pain or cramps. Where can you learn more? Go to [employer] Enter C406 in the search box to learn more about \"Hyperlipidemia: After Your Visit. \"  
 © 7774-9901 Healthwise, [employer]. Care instructions adapted under license by Trinh Wilcox (which disclaims liability or warranty for this information). This care instruction is for use with your licensed healthcare professional. If you have questions about a medical condition or this instruction, always ask your healthcare professional. [address] any warranty or liability for your use of this information. Content Version: 9.3.041959; Last Revised: October 13, 2011 Hyperlipidemia: After Your Visit Your Care Instructions Hyperlipidemia is too much fat in your blood. The body has several kinds of fat, including cholesterol and triglycerides. Your body needs fat for many things, such as making new cells. But too much fat in your blood increases your chances of having a heart attack or stroke. You may be able to lower your cholesterol and triglycerides with a heart-healthy diet, exercise, and if needed, medicine. Your doctor may want you to try lifestyle changes first to see whether they lower the fat in your blood. You may need to take medicine if lifestyle changes do not lower the fat in your blood enough. Follow-up care is a key part of your treatment and safety. Be sure to make and go to all appointments, and call your doctor if you are having problems. Its also a good idea to know your test results and keep a list of the medicines you take. How can you care for yourself at home? Take your medicines · Take your medicines exactly as prescribed. Call your doctor if you think you are having a problem with your medicine. · If you take medicine to lower your cholesterol, go to follow-up visits. You will need to have blood tests. · Do not take large doses of niacin, which is a B vitamin, while taking medicine called statins. It may increase the chance of muscle pain and liver problems.  
· Talk to your doctor about avoiding grapefruit juice if you are taking statins. Grapefruit juice can raise the level of this medicine in your blood. This could increase side effects. Eat more fruits, vegetables, and fiber · Fruits and vegetables have lots of nutrients that help protect against heart disease, and they have littleif anyfat. Try to eat at least five servings a day. Dark green, deep orange, or yellow fruits and vegetables are healthy choices. · Keep carrots, celery, and other veggies handy for snacks. Buy fruit that is in season and store it where you can see it so that you will be tempted to eat it. Cook dishes that have a lot of veggies in them, such as stir-fries and soups. · Foods high in fiber may reduce your cholesterol and provide important vitamins and minerals. High-fiber foods include whole-grain cereals and breads, oatmeal, beans, brown rice, citrus fruits, and apples. · Buy whole-grain breads and cereals instead of white bread and pastries. Limit saturated fat · Read food labels and try to avoid saturated fat and trans fat. They increase your risk of heart disease. · Use olive or canola oil when you cook. Try cholesterol-lowering spreads, such as Benecol or Take Control. · Bake, broil, grill, or steam foods instead of frying them. · Limit the amount of high-fat meats you eat, including hot dogs and sausages. Cut out all visible fat when you prepare meat. · Eat fish, skinless poultry, and soy products such as tofu instead of high-fat meats. Soybeans may be especially good for your heart. Eat at least two servings of fish a week. Certain fish, such as salmon, contain omega-3 fatty acids, which may help reduce your risk of heart attack. · Choose low-fat or fat-free milk and dairy products. Get exercise, limit alcohol, and quit smoking · Get more exercise. Work with your doctor to set up an exercise program. Even if you can do only a small amount, exercise will help you get stronger, have more energy, and manage your weight and your stress. Walking is an easy way to get exercise. Gradually increase the amount you walk every day. Aim for at least 30 minutes on most days of the week. You also may want to swim, bike, or do other activities. · Limit alcohol to no more than 2 drinks a day for men and 1 drink a day for women. · Do not smoke. If you need help quitting, talk to your doctor about stop-smoking programs and medicines. These can increase your chances of quitting for good. When should you call for help? Call 911 anytime you think you may need emergency care. For example, call if: 
· You have symptoms of a heart attack. These may include: ¨ Chest pain or pressure, or a strange feeling in the chest. 
¨ Sweating. ¨ Shortness of breath. ¨ Nausea or vomiting. ¨ Pain, pressure, or a strange feeling in the back, neck, jaw, or upper belly or in one or both shoulders or arms. ¨ Lightheadedness or sudden weakness. ¨ A fast or irregular heartbeat. After you call 911, the  may tell you to chew 1 adult-strength or 2 to 4 low-dose aspirin. Wait for an ambulance. Do not try to drive yourself. · You have signs of a stroke. These may include: 
¨ Sudden numbness, paralysis, or weakness in your face, arm, or leg, especially on only one side of your body. ¨ New problems with walking or balance. ¨ Sudden vision changes. ¨ Drooling or slurred speech. ¨ New problems speaking or understanding simple statements, or feeling confused. ¨ A sudden, severe headache that is different from past headaches. · You passed out (lost consciousness). Call your doctor now or seek immediate medical care if: 
· You have muscle pain or weakness. Watch closely for changes in your health, and be sure to contact your doctor if: 
· You are very tired. · You have an upset stomach, gas, constipation, or belly pain or cramps. Where can you learn more? Go to [employer] Enter [phone] in the search box to learn more about \"Hyperlipidemia: After Your Visit. \"  
© 20062529-2265 Healthwise, [employer]. Care instructions adapted under license by Shivam Finch (which disclaims liability or warranty for this information). This care instruction is for use with your licensed healthcare professional. If you have questions about a medical condition or this instruction, always ask your healthcare professional. [address] any warranty or liability for your use of this information. Content Version: 5.9.953685; Last Revised: October 13, 2011 DASH Diet: Care Instructions Your Care Instructions The DASH diet is an eating plan that can help lower your blood pressure. DASH stands for Dietary Approaches to Stop Hypertension. Hypertension is high blood pressure. The DASH diet focuses on eating foods that are high in calcium, potassium, and magnesium. These nutrients can lower blood pressure. The foods that are highest in these nutrients are fruits, vegetables, low-fat dairy products, nuts, seeds, and legumes. But taking calcium, potassium, and magnesium supplements instead of eating foods that are high in those nutrients does not have the same effect. The DASH diet also includes whole grains, fish, and poultry. The DASH diet is one of several lifestyle changes your doctor may recommend to lower your high blood pressure. Your doctor may also want you to decrease the amount of sodium in your diet. Lowering sodium while following the DASH diet can lower blood pressure even further than just the DASH diet alone. Follow-up care is a key part of your treatment and safety. Be sure to make and go to all appointments, and call your doctor if you are having problems. It's also a good idea to know your test results and keep a list of the medicines you take. How can you care for yourself at home? Following the DASH diet · Eat 4 to 5 servings of fruit each day.  A serving is 1 medium-sized piece of fruit, ½ cup chopped or canned fruit, 1/4 cup dried fruit, or 4 ounces (½ cup) of fruit juice. Choose fruit more often than fruit juice. · Eat 4 to 5 servings of vegetables each day. A serving is 1 cup of lettuce or raw leafy vegetables, ½ cup of chopped or cooked vegetables, or 4 ounces (½ cup) of vegetable juice. Choose vegetables more often than vegetable juice. · Get 2 to 3 servings of low-fat and fat-free dairy each day. A serving is 8 ounces of milk, 1 cup of yogurt, or 1 ½ ounces of cheese. · Eat 6 to 8 servings of grains each day. A serving is 1 slice of bread, 1 ounce of dry cereal, or ½ cup of cooked rice, pasta, or cooked cereal. Try to choose whole-grain products as much as possible. · Limit lean meat, poultry, and fish to 2 servings each day. A serving is 3 ounces, about the size of a deck of cards. · Eat 4 to 5 servings of nuts, seeds, and legumes (cooked dried beans, lentils, and split peas) each week. A serving is 1/3 cup of nuts, 2 tablespoons of seeds, or ½ cup of cooked beans or peas. · Limit fats and oils to 2 to 3 servings each day. A serving is 1 teaspoon of vegetable oil or 2 tablespoons of salad dressing. · Limit sweets and added sugars to 5 servings or less a week. A serving is 1 tablespoon jelly or jam, ½ cup sorbet, or 1 cup of lemonade. · Eat less than 2,300 milligrams (mg) of sodium a day. If you limit your sodium to 1,500 mg a day, you can lower your blood pressure even more. Tips for success · Start small. Do not try to make dramatic changes to your diet all at once. You might feel that you are missing out on your favorite foods and then be more likely to not follow the plan. Make small changes, and stick with them. Once those changes become habit, add a few more changes. · Try some of the following: ¨ Make it a goal to eat a fruit or vegetable at every meal and at snacks. This will make it easy to get the recommended amount of fruits and vegetables each day. ¨ Try yogurt topped with fruit and nuts for a snack or healthy dessert. ¨ Add lettuce, tomato, cucumber, and onion to sandwiches. ¨ Combine a ready-made pizza crust with low-fat mozzarella cheese and lots of vegetable toppings. Try using tomatoes, squash, spinach, broccoli, carrots, cauliflower, and onions. ¨ Have a variety of cut-up vegetables with a low-fat dip as an appetizer instead of chips and dip. ¨ Sprinkle sunflower seeds or chopped almonds over salads. Or try adding chopped walnuts or almonds to cooked vegetables. ¨ Try some vegetarian meals using beans and peas. Add garbanzo or kidney beans to salads. Make burritos and tacos with mashed matos beans or black beans. Where can you learn more? Go to http://[employer].info/. Enter U749 in the search box to learn more about \"DASH Diet: Care Instructions. \" Current as of: April 3, 2017 Content Version: 11.3 © 0161-4030 [employer]. Care instructions adapted under license by [employer] (which disclaims liability or warranty for this information). If you have questions about a medical condition or this instruction, always ask your healthcare professional. [address] any warranty or liability for your use of this information. DASH Diet: Care Instructions Your Care Instructions The DASH diet is an eating plan that can help lower your blood pressure. DASH stands for Dietary Approaches to Stop Hypertension. Hypertension is high blood pressure. The DASH diet focuses on eating foods that are high in calcium, potassium, and magnesium. These nutrients can lower blood pressure. The foods that are highest in these nutrients are fruits, vegetables, low-fat dairy products, nuts, seeds, and legumes. But taking calcium, potassium, and magnesium supplements instead of eating foods that are high in those nutrients does not have the same effect.  The DASH diet also includes whole grains, fish, and poultry. The DASH diet is one of several lifestyle changes your doctor may recommend to lower your high blood pressure. Your doctor may also want you to decrease the amount of sodium in your diet. Lowering sodium while following the DASH diet can lower blood pressure even further than just the DASH diet alone. Follow-up care is a key part of your treatment and safety. Be sure to make and go to all appointments, and call your doctor if you are having problems. It's also a good idea to know your test results and keep a list of the medicines you take. How can you care for yourself at home? Following the DASH diet · Eat 4 to 5 servings of fruit each day. A serving is 1 medium-sized piece of fruit, ½ cup chopped or canned fruit, 1/4 cup dried fruit, or 4 ounces (½ cup) of fruit juice. Choose fruit more often than fruit juice. · Eat 4 to 5 servings of vegetables each day. A serving is 1 cup of lettuce or raw leafy vegetables, ½ cup of chopped or cooked vegetables, or 4 ounces (½ cup) of vegetable juice. Choose vegetables more often than vegetable juice. · Get 2 to 3 servings of low-fat and fat-free dairy each day. A serving is 8 ounces of milk, 1 cup of yogurt, or 1 ½ ounces of cheese. · Eat 6 to 8 servings of grains each day. A serving is 1 slice of bread, 1 ounce of dry cereal, or ½ cup of cooked rice, pasta, or cooked cereal. Try to choose whole-grain products as much as possible. · Limit lean meat, poultry, and fish to 2 servings each day. A serving is 3 ounces, about the size of a deck of cards. · Eat 4 to 5 servings of nuts, seeds, and legumes (cooked dried beans, lentils, and split peas) each week. A serving is 1/3 cup of nuts, 2 tablespoons of seeds, or ½ cup of cooked beans or peas. · Limit fats and oils to 2 to 3 servings each day. A serving is 1 teaspoon of vegetable oil or 2 tablespoons of salad dressing. · Limit sweets and added sugars to 5 servings or less a week.  A serving is 1 tablespoon jelly or jam, ½ cup sorbet, or 1 cup of lemonade. · Eat less than 2,300 milligrams (mg) of sodium a day. If you limit your sodium to 1,500 mg a day, you can lower your blood pressure even more. Tips for success · Start small. Do not try to make dramatic changes to your diet all at once. You might feel that you are missing out on your favorite foods and then be more likely to not follow the plan. Make small changes, and stick with them. Once those changes become habit, add a few more changes. · Try some of the following: ¨ Make it a goal to eat a fruit or vegetable at every meal and at snacks. This will make it easy to get the recommended amount of fruits and vegetables each day. ¨ Try yogurt topped with fruit and nuts for a snack or healthy dessert. ¨ Add lettuce, tomato, cucumber, and onion to sandwiches. ¨ Combine a ready-made pizza crust with low-fat mozzarella cheese and lots of vegetable toppings. Try using tomatoes, squash, spinach, broccoli, carrots, cauliflower, and onions. ¨ Have a variety of cut-up vegetables with a low-fat dip as an appetizer instead of chips and dip. ¨ Sprinkle sunflower seeds or chopped almonds over salads. Or try adding chopped walnuts or almonds to cooked vegetables. ¨ Try some vegetarian meals using beans and peas. Add garbanzo or kidney beans to salads. Make burritos and tacos with mashed matos beans or black beans. Where can you learn more? Go to [email or web address] Enter W625 in the search box to learn more about \"DASH Diet: Care Instructions. \" Current as of: April 3, 2017 Content Version: 11.3 © 6774-6579 [employer]. Care instructions adapted under license by [employer] (which disclaims liability or warranty for this information).  If you have questions about a medical condition or this instruction, always ask your healthcare professional. [employer] disclaims any warranty or liability for your use of this information. Introducing [facility] & HE[facility]! Beronica Amin introduces EPIS patient portal. Now you can access parts of your medical record, email your doctor's office, and request medication refills online. 1. In your internet browser, go to [email or web address] 2. Click on the First Time User? Click Here link in the Sign In box. You will see the New Member Sign Up page. 3. Enter your EPIS Access Code exactly as it appears below. You will not need to use this code after youve completed the sign-up process. If you do not sign up before the expiration date, you must request a new code. · EPIS Access Code: [email or web address] Expires: 9/14/2017 10:03 AM 
 
4. Enter the last four digits of your Social Security Number (xxxx) and Date of Birth (mm/dd/yyyy) as indicated and click Submit. You will be taken to the next sign-up page. 5. Create a EPIS ID. This will be your EPIS login ID and cannot be changed, so think of one that is secure and easy to remember. 6. Create a EPIS password. You can change your password at any time. 7. Enter your Password Reset Question and Answer. This can be used at a later time if you forget your password. 8. Enter your e-mail address. You will receive e-mail notification when new information is available in [address] 9. Click Sign Up. You can now view and download portions of your medical record. 10. Click the Download Summary menu link to download a portable copy of your medical information. If you have questions, please visit the Frequently Asked Questions section of the EPIS website. Remember, EPIS is NOT to be used for urgent needs. For medical emergencies, dial 911. Now available from your iPhone and Android! Please provide this summary of care documentation to your next provider. Your primary care clinician is listed as Genie Viramontes. If you have any questions after today's visit, please call [phone].

## 2017-06-27 ENCOUNTER — HOSPITAL ENCOUNTER (OUTPATIENT)
Dept: LAB | Age: 47
Discharge: HOME | End: 2017-06-27
Payer: OTHER GOVERNMENT

## 2017-06-27 DIAGNOSIS — F43.10 PTSD (POST-TRAUMATIC STRESS DISORDER): ICD-10-CM

## 2017-06-27 DIAGNOSIS — E78.2 MIXED HYPERLIPIDEMIA: ICD-10-CM

## 2017-06-27 DIAGNOSIS — E55.9 VITAMIN D DEFICIENCY: ICD-10-CM

## 2017-06-27 DIAGNOSIS — R73.03 PREDIABETES: ICD-10-CM

## 2017-06-27 DIAGNOSIS — F52.21 ERECTILE DYSFUNCTION OF NON-ORGANIC ORIGIN: ICD-10-CM

## 2017-06-27 LAB
25(OH)D3 SERPL-MCNC: 25.2 NG/ML (ref 30–100)
ALBUMIN SERPL BCP-MCNC: 3.4 G/DL (ref 3.4–5)
ALBUMIN/GLOB SERPL: 1.2 {RATIO} (ref 0.8–1.7)
ALP SERPL-CCNC: 70 U/L (ref 45–117)
ALT SERPL-CCNC: 54 U/L (ref 16–61)
ANION GAP BLD CALC-SCNC: 6 MMOL/L (ref 3–18)
AST SERPL W P-5'-P-CCNC: 26 U/L (ref 15–37)
BASOPHILS # BLD AUTO: 0.1 K/UL (ref 0–0.06)
BASOPHILS # BLD: 1 % (ref 0–2)
BILIRUB SERPL-MCNC: 0.6 MG/DL (ref 0.2–1)
BUN SERPL-MCNC: 12 MG/DL (ref 7–18)
BUN/CREAT SERPL: 14 (ref 12–20)
CALCIUM SERPL-MCNC: 8.2 MG/DL (ref 8.5–10.1)
CHLORIDE SERPL-SCNC: 107 MMOL/L (ref 100–108)
CHOLEST SERPL-MCNC: 269 MG/DL
CO2 SERPL-SCNC: 27 MMOL/L (ref 21–32)
CREAT SERPL-MCNC: 0.88 MG/DL (ref 0.6–1.3)
DIFFERENTIAL METHOD BLD: ABNORMAL
EOSINOPHIL # BLD: 0.3 K/UL (ref 0–0.4)
EOSINOPHIL NFR BLD: 4 % (ref 0–5)
ERYTHROCYTE [DISTWIDTH] IN BLOOD BY AUTOMATED COUNT: 13.2 % (ref 11.6–14.5)
EST. AVERAGE GLUCOSE BLD GHB EST-MCNC: 114 MG/DL
GLOBULIN SER CALC-MCNC: 2.8 G/DL (ref 2–4)
GLUCOSE SERPL-MCNC: 99 MG/DL (ref 74–99)
HBA1C MFR BLD: 5.6 % (ref 4.2–5.6)
HCT VFR BLD AUTO: 42.8 % (ref 36–48)
HDLC SERPL-MCNC: 37 MG/DL (ref 40–60)
HDLC SERPL: 7.3 {RATIO} (ref 0–5)
HGB BLD-MCNC: 14.6 G/DL (ref 13–16)
LDLC SERPL CALC-MCNC: 166.4 MG/DL (ref 0–100)
LIPID PROFILE,FLP: ABNORMAL
LYMPHOCYTES # BLD AUTO: 32 % (ref 21–52)
LYMPHOCYTES # BLD: 2.6 K/UL (ref 0.9–3.6)
MCH RBC QN AUTO: 31.7 PG (ref 24–34)
MCHC RBC AUTO-ENTMCNC: 34.1 G/DL (ref 31–37)
MCV RBC AUTO: 93 FL (ref 74–97)
MONOCYTES # BLD: 0.5 K/UL (ref 0.05–1.2)
MONOCYTES NFR BLD AUTO: 6 % (ref 3–10)
NEUTS SEG # BLD: 4.6 K/UL (ref 1.8–8)
NEUTS SEG NFR BLD AUTO: 57 % (ref 40–73)
PLATELET # BLD AUTO: 217 K/UL (ref 135–420)
PMV BLD AUTO: 11 FL (ref 9.2–11.8)
POTASSIUM SERPL-SCNC: 4.2 MMOL/L (ref 3.5–5.5)
PROT SERPL-MCNC: 6.2 G/DL (ref 6.4–8.2)
RBC # BLD AUTO: 4.6 M/UL (ref 4.7–5.5)
SODIUM SERPL-SCNC: 140 MMOL/L (ref 136–145)
T4 FREE SERPL-MCNC: 1.2 NG/DL (ref 0.7–1.5)
TRIGL SERPL-MCNC: 328 MG/DL (ref ?–150)
TSH SERPL DL<=0.05 MIU/L-ACNC: 1.13 UIU/ML (ref 0.36–3.74)
VLDLC SERPL CALC-MCNC: 65.6 MG/DL
WBC # BLD AUTO: 8.1 K/UL (ref 4.6–13.2)

## 2017-06-27 PROCEDURE — 84403 ASSAY OF TOTAL TESTOSTERONE: CPT | Performed by: NURSE PRACTITIONER

## 2017-06-27 PROCEDURE — 84439 ASSAY OF FREE THYROXINE: CPT | Performed by: NURSE PRACTITIONER

## 2017-06-27 PROCEDURE — 80053 COMPREHEN METABOLIC PANEL: CPT | Performed by: NURSE PRACTITIONER

## 2017-06-27 PROCEDURE — 80061 LIPID PANEL: CPT | Performed by: NURSE PRACTITIONER

## 2017-06-27 PROCEDURE — 83036 HEMOGLOBIN GLYCOSYLATED A1C: CPT | Performed by: NURSE PRACTITIONER

## 2017-06-27 PROCEDURE — 84443 ASSAY THYROID STIM HORMONE: CPT | Performed by: NURSE PRACTITIONER

## 2017-06-27 PROCEDURE — 85025 COMPLETE CBC W/AUTO DIFF WBC: CPT | Performed by: NURSE PRACTITIONER

## 2017-06-27 PROCEDURE — 82306 VITAMIN D 25 HYDROXY: CPT | Performed by: NURSE PRACTITIONER

## 2017-06-29 LAB
COMMENT, TESC2: NORMAL
TESTOST FREE SERPL-MCNC: 10 PG/ML (ref 6.8–21.5)
TESTOST SERPL-MCNC: 463 NG/DL (ref 348–1197)

## 2017-07-05 ENCOUNTER — TELEPHONE (OUTPATIENT)
Dept: FAMILY MEDICINE CLINIC | Age: 47
End: 2017-07-05

## 2017-07-05 DIAGNOSIS — E78.2 MIXED HYPERLIPIDEMIA: Primary | ICD-10-CM

## 2017-07-05 RX ORDER — EZETIMIBE 10 MG/1
10 TABLET ORAL DAILY
Qty: 30 TAB | Refills: 1 | Status: SHIPPED | OUTPATIENT
Start: 2017-07-05

## 2017-07-05 NOTE — PROGRESS NOTES
Pt lipids elevated and worsening from last lab- he is allergic to statins and I have sent Zetia to his pharmacy. I did call and left a message to call our office back.      Vit D slight low- take 1000 units OTC

## 2017-07-11 DIAGNOSIS — F41.9 ANXIETY: ICD-10-CM

## 2017-07-11 DIAGNOSIS — F43.10 PTSD (POST-TRAUMATIC STRESS DISORDER): ICD-10-CM

## 2017-07-14 RX ORDER — DIAZEPAM 5 MG/1
5 TABLET ORAL
Qty: 14 TAB | Refills: 0 | OUTPATIENT
Start: 2017-07-14

## 2018-03-02 ENCOUNTER — OFFICE VISIT (OUTPATIENT)
Dept: FAMILY MEDICINE CLINIC | Age: 48
End: 2018-03-02

## 2018-03-02 VITALS
HEART RATE: 69 BPM | SYSTOLIC BLOOD PRESSURE: 131 MMHG | TEMPERATURE: 97.1 F | OXYGEN SATURATION: 97 % | DIASTOLIC BLOOD PRESSURE: 83 MMHG | RESPIRATION RATE: 17 BRPM | BODY MASS INDEX: 28.72 KG/M2 | HEIGHT: 67 IN | WEIGHT: 183 LBS

## 2018-03-02 DIAGNOSIS — M54.31 SCIATICA OF RIGHT SIDE: Primary | ICD-10-CM

## 2018-03-02 DIAGNOSIS — M72.2 PLANTAR FASCIITIS: ICD-10-CM

## 2018-03-02 DIAGNOSIS — R03.0 ELEVATED BP WITHOUT DIAGNOSIS OF HYPERTENSION: ICD-10-CM

## 2018-03-02 DIAGNOSIS — E78.5 HYPERLIPIDEMIA, UNSPECIFIED HYPERLIPIDEMIA TYPE: ICD-10-CM

## 2018-03-02 DIAGNOSIS — E55.9 VITAMIN D DEFICIENCY: ICD-10-CM

## 2018-03-02 RX ORDER — CYCLOBENZAPRINE HCL 10 MG
10 TABLET ORAL
Qty: 90 TAB | Refills: 1 | Status: SHIPPED | OUTPATIENT
Start: 2018-03-02

## 2018-03-02 RX ORDER — CLONAZEPAM 1 MG/1
1 TABLET ORAL 2 TIMES DAILY
COMMUNITY
Start: 2018-01-09

## 2018-03-02 RX ORDER — PRAZOSIN HYDROCHLORIDE 2 MG/1
2 CAPSULE ORAL
COMMUNITY

## 2018-03-02 RX ORDER — PREDNISONE 20 MG/1
60 TABLET ORAL
Qty: 15 TAB | Refills: 0 | Status: SHIPPED | OUTPATIENT
Start: 2018-03-02

## 2018-03-02 NOTE — PROGRESS NOTES
[facility]    CC: Chronic Disease Management    HPI:     Plantar Fasciitis:  - Bilateral (R>L)  - Repots it is an issue 2/2 his flat foot and a right ankle Broström surgery in 2007  - Has been an issue for the past 10 years  - Seeing podiatry. - Is using orthotics prescribed by podiatrist, which has effectively treated his plantar fasciitis  - States he would like a prescription sent to the [place] for new orthotics  - [facility]    Sciatica:  - Chronic issue  - Currently having a flare.  Unknown trigger.  - Right side  - Pain is sharp and shooting  - Endorses associated spasms    Vitamin D deficiency:  - Last checked on 6-27-17  - Currently not taking any prescribed supplement    HLD:  - Taking Zetia as prescribed  - Denies any side effects or issues with his medication      ROS: Positive items marked in RED  CON: fever, chills  Cardiovascular: palpitations, CP  Resp: cough, SOB  GI: nausea, vomiting, diarrhea  SKIN: rash, itching  : dysuria, hematuria        Past Medical History:   Diagnosis Date    Anemia NEC     Arthritis     Calculus of kidney     Chronic pain     COPD     Hernia 11/02/2014    2    Hypercholesterolemia     Psychotic disorder     PTSD (post-traumatic stress disorder)        Past Surgical History:   Procedure Laterality Date    HX ORTHOPAEDIC  2007, 1986    ankle surgery ( borostrom surgery), l knee surgery       Family History   Problem Relation Age of Onset    Diabetes Mother     Heart Disease Mother     Hypertension Mother     Diabetes Father     Heart Disease Father     Stroke Father     Hypertension Father     Bleeding Prob Father        Social History     Social History    Marital status: UNKNOWN     Spouse name: N/A    Number of children: N/A    Years of education: N/A     Social History Main Topics    Smoking status: Current Every Day Smoker     Packs/day: 0.50     Years: 29.00     Types: Cigarettes    Smokeless tobacco: Never Used    Alcohol use Not on file    Drug use: No    Sexual activity: Not on file     Other Topics Concern    Not on file     Social History Narrative       Allergies   Allergen Reactions    Prazosin Drowsiness    Crestor [Rosuvastatin] Myalgia    Lipitor [Atorvastatin] Other (comments)     cramps         Current Outpatient Prescriptions:     clonazePAM (KLONOPIN) 1 mg tablet, Take 1 mg by mouth two (2) times a day., Disp: , Rfl:     prazosin (MINIPRESS) 2 mg capsule, Take 2 mg by mouth nightly., Disp: , Rfl:     ezetimibe (ZETIA) 10 mg tablet, Take 1 Tab by mouth daily. , Disp: 30 Tab, Rfl: 1    sildenafil citrate (VIAGRA) 50 mg tablet, Take 1 tab by mouth as needed 1 hour prior to sexual activity, Disp: 8 Tab, Rfl: 2    Physical Exam:      /83  Pulse 69  Temp 97.1 °F (36.2 °C)  Resp 17  Ht 5' 7\" (1.702 m)  Wt 183 lb (83 kg)  SpO2 97%  BMI 28.66 kg/m2    General:  WD, WN, NAD  Eyes: sclera clear bilaterally, no discharge noted, eyelids normal in appearance  HENT: NCAT  Lungs: CTAB, Normal respiratory effort and rate  CV: RRR, no MRGs  ABD: soft, non-tender, non-distended, normal bowel sounds  Skin: normal temperature, turgor, color, and texture  Psych: alert and oriented to person, place and time, normal affect  Neuro: Speech normal, Moving all extremities      Assessment/Plan     Plantar Fasciitis:  - Will defer management to podiatry  - Discussed with patient that we were not integrated with the [place] and I was not sure how DME is done through the [place] system  - Recommended he discuss the process at his upcoming appointment at the [place]      Sciatica:  - Recommended rest and use of OTC medication  - Will start on prednisone and Flexeril regimen  - Follow up in one month      HLD:  - Will continue current Zetia regimen  - Will check fasting lipid panel  - Follow up in one month      Vitamin D Deficiency:  - Will check Vitamin D level  - Follow up in one month      Elevated BP:  - Likely elevated due to current pain  - Will check CMP and CBC  - BP will need to be re-evaluated at follow up visit  - Follow up in one month        Barbi Vergara MD  3/2/2018, 9:01 AM

## 2018-03-10 LAB
25(OH)D3+25(OH)D2 SERPL-MCNC: 13.2 NG/ML (ref 30–100)
ALBUMIN SERPL-MCNC: 4.3 G/DL (ref 3.5–5.5)
ALBUMIN/GLOB SERPL: 2 {RATIO} (ref 1.2–2.2)
ALP SERPL-CCNC: 66 IU/L (ref 39–117)
ALT SERPL-CCNC: 26 IU/L (ref 0–44)
AST SERPL-CCNC: 25 IU/L (ref 0–40)
BASOPHILS # BLD AUTO: 0 X10E3/UL (ref 0–0.2)
BASOPHILS NFR BLD AUTO: 0 %
BILIRUB SERPL-MCNC: 0.8 MG/DL (ref 0–1.2)
BUN SERPL-MCNC: 11 MG/DL (ref 6–24)
BUN/CREAT SERPL: 14 (ref 9–20)
CALCIUM SERPL-MCNC: 9.2 MG/DL (ref 8.7–10.2)
CHLORIDE SERPL-SCNC: 101 MMOL/L (ref 96–106)
CHOLEST SERPL-MCNC: 247 MG/DL (ref 100–199)
CO2 SERPL-SCNC: 25 MMOL/L (ref 18–29)
CREAT SERPL-MCNC: 0.78 MG/DL (ref 0.76–1.27)
EOSINOPHIL # BLD AUTO: 0.3 X10E3/UL (ref 0–0.4)
EOSINOPHIL NFR BLD AUTO: 4 %
ERYTHROCYTE [DISTWIDTH] IN BLOOD BY AUTOMATED COUNT: 13.5 % (ref 12.3–15.4)
GFR SERPLBLD CREATININE-BSD FMLA CKD-EPI: 106 ML/MIN/1.73
GFR SERPLBLD CREATININE-BSD FMLA CKD-EPI: 123 ML/MIN/1.73
GLOBULIN SER CALC-MCNC: 2.2 G/DL (ref 1.5–4.5)
GLUCOSE SERPL-MCNC: 92 MG/DL (ref 65–99)
HCT VFR BLD AUTO: 42.1 % (ref 37.5–51)
HDLC SERPL-MCNC: 31 MG/DL
HGB BLD-MCNC: 14.3 G/DL (ref 13–17.7)
IMM GRANULOCYTES # BLD: 0 X10E3/UL (ref 0–0.1)
IMM GRANULOCYTES NFR BLD: 0 %
INTERPRETATION, 910389: NORMAL
LDLC SERPL CALC-MCNC: 152 MG/DL (ref 0–99)
LYMPHOCYTES # BLD AUTO: 2.7 X10E3/UL (ref 0.7–3.1)
LYMPHOCYTES NFR BLD AUTO: 30 %
MCH RBC QN AUTO: 32.3 PG (ref 26.6–33)
MCHC RBC AUTO-ENTMCNC: 34 G/DL (ref 31.5–35.7)
MCV RBC AUTO: 95 FL (ref 79–97)
MONOCYTES # BLD AUTO: 0.5 X10E3/UL (ref 0.1–0.9)
MONOCYTES NFR BLD AUTO: 5 %
NEUTROPHILS # BLD AUTO: 5.5 X10E3/UL (ref 1.4–7)
NEUTROPHILS NFR BLD AUTO: 61 %
PLATELET # BLD AUTO: 240 X10E3/UL (ref 150–379)
POTASSIUM SERPL-SCNC: 4.5 MMOL/L (ref 3.5–5.2)
PROT SERPL-MCNC: 6.5 G/DL (ref 6–8.5)
RBC # BLD AUTO: 4.43 X10E6/UL (ref 4.14–5.8)
SODIUM SERPL-SCNC: 142 MMOL/L (ref 134–144)
TRIGL SERPL-MCNC: 318 MG/DL (ref 0–149)
VLDLC SERPL CALC-MCNC: 64 MG/DL (ref 5–40)
WBC # BLD AUTO: 9.1 X10E3/UL (ref 3.4–10.8)

## 2018-03-13 ENCOUNTER — OFFICE VISIT (OUTPATIENT)
Dept: FAMILY MEDICINE CLINIC | Age: 48
End: 2018-03-13

## 2018-03-13 VITALS
BODY MASS INDEX: 28.72 KG/M2 | WEIGHT: 183 LBS | TEMPERATURE: 97.4 F | SYSTOLIC BLOOD PRESSURE: 134 MMHG | HEART RATE: 76 BPM | DIASTOLIC BLOOD PRESSURE: 75 MMHG | OXYGEN SATURATION: 98 % | HEIGHT: 67 IN | RESPIRATION RATE: 17 BRPM

## 2018-03-13 DIAGNOSIS — M54.31 SCIATICA OF RIGHT SIDE: ICD-10-CM

## 2018-03-13 DIAGNOSIS — E78.5 HYPERLIPIDEMIA, UNSPECIFIED HYPERLIPIDEMIA TYPE: ICD-10-CM

## 2018-03-13 DIAGNOSIS — E55.9 VITAMIN D DEFICIENCY: ICD-10-CM

## 2018-03-13 DIAGNOSIS — I10 ESSENTIAL HYPERTENSION: ICD-10-CM

## 2018-03-13 DIAGNOSIS — G47.33 OSA (OBSTRUCTIVE SLEEP APNEA): Primary | ICD-10-CM

## 2018-03-13 RX ORDER — MELATONIN
1000 DAILY
Qty: 90 TAB | Refills: 1 | Status: SHIPPED | OUTPATIENT
Start: 2018-03-13

## 2018-03-13 RX ORDER — LISINOPRIL 10 MG/1
10 TABLET ORAL DAILY
Qty: 30 TAB | Refills: 5 | Status: SHIPPED | OUTPATIENT
Start: 2018-03-13

## 2018-03-13 RX ORDER — PRAVASTATIN SODIUM 10 MG/1
10 TABLET ORAL
Qty: 30 TAB | Refills: 5 | Status: SHIPPED | OUTPATIENT
Start: 2018-03-13

## 2018-03-13 NOTE — PROGRESS NOTES
[facility]    CC: F/U for chronic Disease Management    HPI:     HLD:  - Got requested fasting lipid panel  - Hx of myalgias with Crestor and Lipitor  - Currently taking Zetia as prescribed without any side effects or issues  - Diet is currently unrestricted  - Does not exercise      HTN:  - Got requested blood work  - On no medication  - Diet is unrestricted  - Does not check BP at home  - Endorses regular caffiene use (2 cups of coffee in morning and red bull in the middle of the day)  - Does not exercise      RANCHO:  - First diagnosed around 2008  - Using CPAP as prescribed without any issue  - Was being seen by [facility]  - Has appointment with Dr. Ameya Calderon this week  - Needs referral      Nicotine Dependence:  - unchanged since last visit  - Has tried multiple times to quit in the past using various treatment strategies with no success  - Currently not ready to quit      Sciatica:  - Took medication as prescribed  -  Reports that shooting pain has resolved      Vitamin D Deficiency:  - On no vitamin D supplement      ROS: Positive items marked in RED  CON: fever, chills  HEENT: HA  Cardiovascular: palpitations, CP  Resp: cough, SOB  Neuro: numbness, weakness, changes in vision/speech      Past Medical History:   Diagnosis Date    Anemia NEC     Arthritis     Calculus of kidney     Chronic pain     COPD     Hernia 11/02/2014    2    Hypercholesterolemia     Psychotic disorder     PTSD (post-traumatic stress disorder)        Past Surgical History:   Procedure Laterality Date    HX ORTHOPAEDIC  2007, 1986    ankle surgery ( borostrom surgery), l knee surgery       Family History   Problem Relation Age of Onset    Diabetes Mother     Heart Disease Mother     Hypertension Mother     Diabetes Father     Heart Disease Father     Stroke Father     Hypertension Father     Bleeding Prob Father        Social History     Social History    Marital status: UNKNOWN Spouse name: N/A    Number of children: N/A    Years of education: N/A     Social History Main Topics    Smoking status: Current Every Day Smoker     Packs/day: 0.50     Years: 29.00     Types: Cigarettes    Smokeless tobacco: Never Used    Alcohol use None    Drug use: No    Sexual activity: Not Asked     Other Topics Concern    None     Social History Narrative       Allergies   Allergen Reactions    Prazosin Drowsiness    Crestor [Rosuvastatin] Myalgia    Lipitor [Atorvastatin] Other (comments)     cramps         Current Outpatient Prescriptions:     clonazePAM (KLONOPIN) 1 mg tablet, Take 1 mg by mouth two (2) times a day., Disp: , Rfl:     prazosin (MINIPRESS) 2 mg capsule, Take 2 mg by mouth nightly., Disp: , Rfl:     predniSONE (DELTASONE) 20 mg tablet, Take 3 Tabs by mouth daily (with breakfast). , Disp: 15 Tab, Rfl: 0    cyclobenzaprine (FLEXERIL) 10 mg tablet, Take 1 Tab by mouth three (3) times daily as needed for Muscle Spasm(s). Indications: Muscle Spasm, Disp: 90 Tab, Rfl: 1    ezetimibe (ZETIA) 10 mg tablet, Take 1 Tab by mouth daily. , Disp: 30 Tab, Rfl: 1    sildenafil citrate (VIAGRA) 50 mg tablet, Take 1 tab by mouth as needed 1 hour prior to sexual activity, Disp: 8 Tab, Rfl: 2    Physical Exam:      /75 (BP 1 Location: Left arm, BP Patient Position: Sitting)  Pulse 76  Temp 97.4 °F (36.3 °C) (Oral)   Resp 17  Ht 5' 7\" (1.702 m)  Wt 183 lb (83 kg)  SpO2 98%  BMI 28.66 kg/m2    General:  WD, WN, NAD  Eyes: sclera clear bilaterally, no discharge noted, eyelids normal in appearance  HENT: NCAT  Lungs: CTAB, Normal respiratory effort and rate  CV: RRR, no MRGs  ABD: soft, non-tender, non-distended, normal bowel sounds  Ext: no peripheral edema or digital cyanosis noted  Skin: normal temperature, turgor, color, and texture  Psych: alert and oriented to person, place and time, normal affect  Neuro: speech normal, moving all extremities, gait normal    Results for Domenica Love (MRN [id number]):   Ref. Range 3/9/2018 08:31   WBC Latest Ref Range: 3.4 - 10.8 x10E3/uL 9.1   RBC Latest Ref Range: 4.14 - 5.80 x10E6/uL 4.43   HGB Latest Ref Range: 13.0 - 17.7 g/dL 14.3   HCT Latest Ref Range: 37.5 - 51.0 % 42.1   MCV Latest Ref Range: 79 - 97 fL 95   MCH Latest Ref Range: 26.6 - 33.0 pg 32.3   MCHC Latest Ref Range: 31.5 - 35.7 g/dL 34.0   RDW Latest Ref Range: 12.3 - 15.4 % 13.5   PLATELET Latest Ref Range: 150 - 379 x10E3/uL 240   NEUTROPHILS Latest Ref Range: Not Estab. % 61   Lymphocytes Latest Ref Range: Not Estab. % 30   MONOCYTES Latest Ref Range: Not Estab. % 5   EOSINOPHILS Latest Ref Range: Not Estab. % 4   BASOPHILS Latest Ref Range: Not Estab. % 0   IMMATURE GRANULOCYTES Latest Ref Range: Not Estab. % 0   ABS. NEUTROPHILS Latest Ref Range: 1.4 - 7.0 x10E3/uL 5.5   ABS. IMM. GRANS. Latest Ref Range: 0.0 - 0.1 x10E3/uL 0.0   Abs Lymphocytes Latest Ref Range: 0.7 - 3.1 x10E3/uL 2.7   ABS. MONOCYTES Latest Ref Range: 0.1 - 0.9 x10E3/uL 0.5   ABS. EOSINOPHILS Latest Ref Range: 0.0 - 0.4 x10E3/uL 0.3   ABS. BASOPHILS Latest Ref Range: 0.0 - 0.2 x10E3/uL 0.0   Sodium Latest Ref Range: 134 - 144 mmol/L 142   Potassium Latest Ref Range: 3.5 - 5.2 mmol/L 4.5   Chloride Latest Ref Range: 96 - 106 mmol/L 101   CO2 Latest Ref Range: 18 - 29 mmol/L 25   Glucose Latest Ref Range: 65 - 99 mg/dL 92   BUN Latest Ref Range: 6 - 24 mg/dL 11   Creatinine Latest Ref Range: 0.76 - 1.27 mg/dL 0.78   BUN/Creatinine ratio Latest Ref Range: 9 - 20  14   Calcium Latest Ref Range: 8.7 - 10.2 mg/dL 9.2   GFR est non-AA Latest Ref Range: >59 mL/min/1.73 106   GFR est AA Latest Ref Range: >59 mL/min/1.73 123   Bilirubin, total Latest Ref Range: 0.0 - 1.2 mg/dL 0.8   Protein, total Latest Ref Range: 6.0 - 8.5 g/dL 6.5   Albumin Latest Ref Range: 3.5 - 5.5 g/dL 4.3   A-G Ratio Latest Ref Range: 1.2 - 2.2  2.0   ALT (SGPT) Latest Ref Range: 0 - 44 IU/L 26   AST Latest Ref Range: 0 - 40 IU/L 25   Alk. phosphatase Latest Ref Range: 39 - 117 IU/L 66   Triglyceride Latest Ref Range: 0 - 149 mg/dL 318 (H)   Cholesterol, total Latest Ref Range: 100 - 199 mg/dL 247 (H)   HDL Cholesterol Latest Ref Range: >39 mg/dL 31 (L)   LDL, calculated Latest Ref Range: 0 - 99 mg/dL 152 (H)   VLDL, calculated Latest Ref Range: 5 - 40 mg/dL 64 (H)   VITAMIN D, 25-HYDROXY Latest Ref Range: 30.0 - 100.0 ng/mL 13.2 (L)       Assessment/Plan     HTN, Inadequately Controlled:  - Stage 1  - 10 year ASCVD risk calculated to be 17.1% today  - Will start on BP medication today (Lisinopril 10 mg daily), per recommendations of AHA/ACC  - Advised to decrease caffeine intake, start exercising (150 min a week), and to work on improving his diet  - Advised to start monitoring his BP at home  - Follow up in one month      HLD, Inadequately Controlled:  - 10 year ASCVD risk calculated to be 17.1% today  - Will start trial on statin therapy today, per recommendations of AHA/ACC  - Will try pravastatin, given history of myalgias on Lipitor and Crestor  - Advised patient to stop medication, if he develops any myalgias  - Will continue current Zetia regimen  - Follow up in one month      RANCHO:  - Referral to Sleep Medicine ordered  - Will defer to Sleep Medicine for CPAP management      Nicotine Dependence, Unchanged:  - Patient advised to consider trying to quit smoking again in the future, given his high ASCVD risk      Sciatica, Resolved:  - Patient to use flexeril as needed for the spasms      *Addendum*  Vitamin D Deficiency:  - Will start Vitamin D repletion          Vishnu Clements MD  3/13/2018, 11:07 AM

## 2018-03-13 NOTE — MR AVS SNAPSHOT
[facility] [place] [address] [facility] [address] [facility] [address] [address] 
[phone] Patient: Deirdre ePralta MRN: [id number] Jenna Clayton Visit Information Date & Time Provider Department Dept. Phone Encounter #  
 3/13/2018 10:45 AM Milton Neves, [address] [place] [address] [phone] [id number] Follow-up Instructions Return in about 1 month (around 4/13/2018). Upcoming Health Maintenance Date Due Pneumococcal 19-64 Medium Risk (1 of 1 - PPSV23) 5/9/1987 Influenza Age 5 to Adult 8/1/2017 DTaP/Tdap/Td series (2 - Td) 4/1/2024 Allergies as of 3/13/2018  Review Complete On: 3/13/2018 By: Jerson Graham Severity Noted Reaction Type Reactions Prazosin High 12/04/2014   Side Effect Drowsiness Crestor [Rosuvastatin]  10/27/2014    Myalgia Lipitor [Atorvastatin]  11/17/2014   Not Verified Other (comments)  
 cramps Current Immunizations  Reviewed on 11/8/2016 Name Date Influenza Vaccine 10/27/2014 Influenza Vaccine (Quad) PF 11/8/2016 Influenza Vaccine Split 12/17/2012 Tdap 4/1/2014 Not reviewed this visit You Were Diagnosed With   
  
 Codes Comments RANCHO (obstructive sleep apnea)    -  Primary ICD-10-CM: G47.33 
ICD-9-CM: 327.23 Essential hypertension     ICD-10-CM: I10 
ICD-9-CM: 401.9 Hyperlipidemia, unspecified hyperlipidemia type     ICD-10-CM: E78.5 ICD-9-CM: 272.4 Sciatica of right side     ICD-10-CM: M54.31 
ICD-9-CM: 724.3 Vitals BP Pulse Temp Resp Height(growth percentile) Weight(growth percentile) 134/75 (BP 1 Location: Left arm, BP Patient Position: Sitting) 76 97.4 °F (36.3 °C) (Oral) 17 5' 7\" (1.702 m) 183 lb (83 kg) SpO2 BMI Smoking Status 98% 28.66 kg/m2 Current Every Day Smoker BMI and BSA Data Body Mass Index Body Surface Area  
 28.66 kg/m 2 1.98 m 2 Preferred Pharmacy Pharmacy Name Phone [address] [phone] Your Updated Medication List  
  
   
This list is accurate as of 3/13/18 11:35 AM.  Always use your most recent med list.  
  
  
  
  
 clonazePAM 1 mg tablet Commonly known as:  Halie Mario Take 1 mg by mouth two (2) times a day. Indications: Panic Disorder  
  
 cyclobenzaprine 10 mg tablet Commonly known as:  FLEXERIL Take 1 Tab by mouth three (3) times daily as needed for Muscle Spasm(s). Indications: Muscle Spasm  
  
 ezetimibe 10 mg tablet Commonly known as:  Beckey Salen Take 1 Tab by mouth daily. lisinopril 10 mg tablet Commonly known as:  Elxi Grebe Take 1 Tab by mouth daily. Indications: hypertension  
  
 pravastatin 10 mg tablet Commonly known as:  PRAVACHOL Take 1 Tab by mouth nightly. Indications: hyperlipidemia  
  
 prazosin 2 mg capsule Commonly known as:  MINIPRESS Take 2 mg by mouth nightly. Indications: CHRONIC PTSD WITH TRAUMA NIGHTMARES  
  
 predniSONE 20 mg tablet Commonly known as:  Phyliss Drown Take 3 Tabs by mouth daily (with breakfast). sildenafil citrate 50 mg tablet Commonly known as:  VIAGRA Take 1 tab by mouth as needed 1 hour prior to sexual activity Prescriptions Sent to Pharmacy Refills  
 lisinopril (PRINIVIL, ZESTRIL) 10 mg tablet 5 Sig: Take 1 Tab by mouth daily. Indications: hypertension Class: Normal  
 Pharmacy: [address], [facility] Ph #: [phone] Route: Oral  
 pravastatin (PRAVACHOL) 10 mg tablet 5 Sig: Take 1 Tab by mouth nightly. Indications: hyperlipidemia Class: Normal  
 Pharmacy: [address], [facility] Ph #: [phone] Route: Oral  
  
We Performed the Following SLEEP MEDICINE REFERRAL [WPP686 Custom]  Comments:  
 [facility] MD 
 Orders: 
Sleep Medicine Consult - Schedule patient for a sleep specialist consult. If appropriate, schedule patient for sleep study(s). Initiate treatment if needed. Forward correspondance to my office. Follow-up Instructions Return in about 1 month (around 4/13/2018). Referral Information Referral ID Referred By Referred To  
  
 [id number] Porfirio SARAVIA Not Available Visits Status Start Date End Date 1 New Request 3/13/18 3/13/19 If your referral has a status of pending review or denied, additional information will be sent to support the outcome of this decision. Introducing [facility]! [address] [facility] introduces Lookmash patient portal. Now you can access parts of your medical record, email your doctor's office, and request medication refills online. 1. In your internet browser, go to [email or web address] 2. Click on the First Time User? Click Here link in the Sign In box. You will see the New Member Sign Up page. 3. Enter your Lookmash Access Code exactly as it appears below. You will not need to use this code after youve completed the sign-up process. If you do not sign up before the expiration date, you must request a new code. · Lookmash Access Code: [email or web address] 
Expires: 5/31/2018 10:37 AM 
 
4. Enter the last four digits of your Social Security Number (xxxx) and Date of Birth (mm/dd/yyyy) as indicated and click Submit. You will be taken to the next sign-up page. 5. Create a Kontagentt ID. This will be your Lookmash login ID and cannot be changed, so think of one that is secure and easy to remember. 6. Create a Lookmash password. You can change your password at any time. 7. Enter your Password Reset Question and Answer. This can be used at a later time if you forget your password. 8. Enter your e-mail address. You will receive e-mail notification when new information is available in [address] 9. Click Sign Up. You can now view and download portions of your medical record. 10. Click the Download Summary menu link to download a portable copy of your medical information. If you have questions, please visit the Frequently Asked Questions section of the [employer] website. Remember, [employer] is NOT to be used for urgent needs. For medical emergencies, dial 911. Now available from your iPhone and Android! Please provide this summary of care documentation to your next provider. Your primary care clinician is listed as Aurelia Bernal. If you have any questions after today's visit, please call [phone].

## 2018-03-13 NOTE — PROGRESS NOTES
1. Have you been to the ER, urgent care clinic since your last visit? Hospitalized since your last visit? No    2. Have you seen or consulted any other health care providers outside of the [address] [place] [address] since your last visit? Include any pap smears or colon screening. Yes When: Patient saw Psych on 3-9-2018       Patient here today for a follow-up.